# Patient Record
Sex: FEMALE | Race: BLACK OR AFRICAN AMERICAN | NOT HISPANIC OR LATINO | Employment: OTHER | URBAN - METROPOLITAN AREA
[De-identification: names, ages, dates, MRNs, and addresses within clinical notes are randomized per-mention and may not be internally consistent; named-entity substitution may affect disease eponyms.]

---

## 2022-01-26 ENCOUNTER — TELEPHONE (OUTPATIENT)
Dept: OBSTETRICS AND GYNECOLOGY | Facility: CLINIC | Age: 82
End: 2022-01-26
Payer: MEDICARE

## 2022-01-26 NOTE — TELEPHONE ENCOUNTER
----- Message from Chichi Espinoza sent at 1/25/2022 10:54 AM CST -----  Contact: pt  Type: Needs Medical Advice    Who Called:pt  Best Call Back Number:[phone]  Additional  Information: pt would like an order put in to get her mammo done and schedule annual if it's time  Please Advise- Thank you

## 2022-02-15 ENCOUNTER — OFFICE VISIT (OUTPATIENT)
Dept: OBSTETRICS AND GYNECOLOGY | Facility: CLINIC | Age: 82
End: 2022-02-15
Payer: MEDICARE

## 2022-02-15 VITALS — DIASTOLIC BLOOD PRESSURE: 74 MMHG | WEIGHT: 178 LBS | SYSTOLIC BLOOD PRESSURE: 116 MMHG

## 2022-02-15 DIAGNOSIS — Z01.419 ENCOUNTER FOR ANNUAL ROUTINE GYNECOLOGICAL EXAMINATION: Primary | ICD-10-CM

## 2022-02-15 PROCEDURE — G0101 PR CA SCREEN;PELVIC/BREAST EXAM: ICD-10-PCS | Mod: S$GLB,,, | Performed by: OBSTETRICS & GYNECOLOGY

## 2022-02-15 PROCEDURE — G0101 CA SCREEN;PELVIC/BREAST EXAM: HCPCS | Mod: S$GLB,,, | Performed by: OBSTETRICS & GYNECOLOGY

## 2022-02-15 RX ORDER — LATANOPROST 50 UG/ML
1 SOLUTION/ DROPS OPHTHALMIC NIGHTLY
COMMUNITY
Start: 2022-02-04

## 2022-02-15 RX ORDER — ASPIRIN 81 MG/1
81 TABLET ORAL
COMMUNITY
Start: 2021-07-06 | End: 2023-06-20

## 2022-02-15 RX ORDER — SUCRALFATE 1 G/1
1 TABLET ORAL
COMMUNITY
Start: 2021-07-10 | End: 2023-06-20

## 2022-02-15 RX ORDER — LEVOTHYROXINE SODIUM 75 UG/1
1 TABLET ORAL EVERY MORNING
COMMUNITY
Start: 2022-01-19 | End: 2023-06-20

## 2022-02-15 RX ORDER — METFORMIN HYDROCHLORIDE 500 MG/1
500 TABLET, EXTENDED RELEASE ORAL
COMMUNITY
Start: 2022-01-14 | End: 2023-06-20 | Stop reason: SDUPTHER

## 2022-02-15 RX ORDER — CLOTRIMAZOLE AND BETAMETHASONE DIPROPIONATE 10; .64 MG/G; MG/G
CREAM TOPICAL 2 TIMES DAILY
COMMUNITY
End: 2023-02-28 | Stop reason: SDUPTHER

## 2022-02-15 RX ORDER — METOPROLOL TARTRATE 25 MG/1
25 TABLET, FILM COATED ORAL
COMMUNITY
Start: 2021-03-01 | End: 2023-06-20

## 2022-02-15 RX ORDER — FAMOTIDINE 40 MG/1
40 TABLET, FILM COATED ORAL
COMMUNITY
Start: 2021-08-31 | End: 2023-06-20 | Stop reason: SDUPTHER

## 2022-02-15 RX ORDER — MULTIVITAMIN
1 TABLET ORAL DAILY
COMMUNITY

## 2022-02-15 RX ORDER — OMEPRAZOLE 40 MG/1
40 CAPSULE, DELAYED RELEASE ORAL 2 TIMES DAILY
COMMUNITY
Start: 2021-08-31

## 2022-02-15 RX ORDER — METOPROLOL SUCCINATE 50 MG/1
50 TABLET, EXTENDED RELEASE ORAL
COMMUNITY
Start: 2021-07-06 | End: 2023-06-20 | Stop reason: SDUPTHER

## 2022-02-15 RX ORDER — DIAPER,BRIEF,INFANT-TODD,DISP
1 EACH MISCELLANEOUS DAILY
COMMUNITY

## 2022-02-15 NOTE — PROGRESS NOTES
Medicare Breast and Pelvic    HPI:  Juan Alberto Hendrickson is a 81 y.o. female  presents for a well woman exam.  LMP: No LMP recorded. Patient has had a hysterectomy..  No issues, problems, or complaints.    Past Medical History:   Diagnosis Date    Diabetes mellitus     Hyperlipidemia     Hypertension     Tachycardia     Thyroid disease      Past Surgical History:   Procedure Laterality Date    CHOLECYSTECTOMY      HYSTERECTOMY      THYROID SURGERY      TOTAL KNEE REPLACEMENT USING COMPUTER NAVIGATION       Social History     Socioeconomic History    Marital status:    Tobacco Use    Smoking status: Never Smoker    Smokeless tobacco: Never Used   Substance and Sexual Activity    Alcohol use: Not Currently    Sexual activity: Not Currently     Family History   Problem Relation Age of Onset    Colon cancer Paternal Uncle     Breast cancer Neg Hx     Ovarian cancer Neg Hx     Uterine cancer Neg Hx      OB History        7    Para   7    Term                AB        Living           SAB        IAB        Ectopic        Multiple        Live Births                     /74 (BP Location: Right arm, Patient Position: Sitting)   Wt 80.7 kg (178 lb)     ROS:  GENERAL: Denies weight gain or weight loss. Feeling well overall.   SKIN: Denies rash or lesions.   HEAD: Denies head injury or headache.   NODES: Denies enlarged lymph nodes.   CHEST: Denies chest pain or shortness of breath.   CARDIOVASCULAR: Denies palpitations or left sided chest pain.   ABDOMEN: No abdominal pain, constipation, diarrhea, nausea, vomiting or rectal bleeding.   URINARY: No frequency, dysuria, hematuria, or burning on urination.  REPRODUCTIVE: See HPI.   BREASTS: The patient performs breast self-examination and denies pain, lumps, or nipple discharge.   HEMATOLOGIC: No easy bruisability or excessive bleeding.   MUSCULOSKELETAL: Denies joint pain or swelling.   NEUROLOGIC: Denies syncope or weakness.    PSYCHIATRIC: Denies depression, anxiety or mood swings.    PHYSICAL EXAM:    APPEARANCE: Well nourished, well developed, in no acute distress.  AFFECT: WNL, alert and oriented x 3  SKIN: No acne or hirsutism  NECK: Neck symmetric without masses or thyromegaly  NODES: No inguinal, cervical, axillary, or femoral lymph node enlargement  CHEST: Good respiratory effect  ABDOMEN: Soft.  No tenderness or masses.  No hepatosplenomegaly.  No hernias.  BREASTS: Symmetrical, no skin changes or visible lesions.  No palpable masses, nipple discharge bilaterally.  PELVIC: Normal external genitalia without lesions.  Normal hair distribution.  Adequate perineal body, normal urethral meatus.  Vagina moist and well rugated without lesions or discharge.  CERVIX: Absent, No significant cystocele or rectocele.    UTERUS: Absent,  Adnexa without masses or tenderness.    RECTAL: Rectovaginal exam confirms above with normal sphincter tone, no masses.  EXTREMITIES: No edema.      ICD-10-CM ICD-9-CM    1. Encounter for annual routine gynecological examination  Z01.419 V72.31      Mammogram ordered  Return in 1 year or as needed

## 2023-02-16 LAB
LEFT EYE DM RETINOPATHY: NEGATIVE
RIGHT EYE DM RETINOPATHY: NEGATIVE

## 2023-02-28 ENCOUNTER — OFFICE VISIT (OUTPATIENT)
Dept: OBSTETRICS AND GYNECOLOGY | Facility: CLINIC | Age: 83
End: 2023-02-28
Payer: MEDICARE

## 2023-02-28 VITALS
BODY MASS INDEX: 29.82 KG/M2 | HEIGHT: 65 IN | WEIGHT: 179 LBS | DIASTOLIC BLOOD PRESSURE: 82 MMHG | SYSTOLIC BLOOD PRESSURE: 132 MMHG

## 2023-02-28 DIAGNOSIS — N76.3 CHRONIC VULVITIS: ICD-10-CM

## 2023-02-28 DIAGNOSIS — Z01.419 ENCOUNTER FOR ANNUAL ROUTINE GYNECOLOGICAL EXAMINATION: Primary | ICD-10-CM

## 2023-02-28 PROCEDURE — G0101 PR CA SCREEN;PELVIC/BREAST EXAM: ICD-10-PCS | Mod: GZ,S$GLB,, | Performed by: OBSTETRICS & GYNECOLOGY

## 2023-02-28 PROCEDURE — G0101 CA SCREEN;PELVIC/BREAST EXAM: HCPCS | Mod: GZ,S$GLB,, | Performed by: OBSTETRICS & GYNECOLOGY

## 2023-02-28 RX ORDER — SIMVASTATIN 40 MG/1
40 TABLET, FILM COATED ORAL
COMMUNITY
Start: 2022-05-05 | End: 2023-06-20 | Stop reason: SDUPTHER

## 2023-02-28 RX ORDER — LEVOTHYROXINE SODIUM 50 UG/1
50 TABLET ORAL EVERY MORNING
COMMUNITY
Start: 2023-02-18 | End: 2023-06-20

## 2023-02-28 RX ORDER — CLOTRIMAZOLE AND BETAMETHASONE DIPROPIONATE 10; .64 MG/G; MG/G
CREAM TOPICAL 2 TIMES DAILY PRN
Qty: 15 G | Refills: 2 | Status: SHIPPED | OUTPATIENT
Start: 2023-02-28 | End: 2024-03-05 | Stop reason: SDUPTHER

## 2023-02-28 NOTE — PROGRESS NOTES
"Medicare Breast and Pelvic    HPI:  Juan Alberto Hendrickson is a 83 y.o. female  presents for a well woman exam.  LMP: No LMP recorded. Patient has had a hysterectomy..  No issues, problems, or complaints.    Past Medical History:   Diagnosis Date    Diabetes mellitus     Hyperlipidemia     Hypertension     Tachycardia     Thyroid disease      Past Surgical History:   Procedure Laterality Date    CHOLECYSTECTOMY      HYSTERECTOMY      TAHBSO age 31    OOPHORECTOMY Bilateral     BSO with hysterectomy    THYROID SURGERY      TOTAL KNEE REPLACEMENT USING COMPUTER NAVIGATION       Social History     Socioeconomic History    Marital status:    Tobacco Use    Smoking status: Never    Smokeless tobacco: Never   Substance and Sexual Activity    Alcohol use: Not Currently    Sexual activity: Not Currently     Family History   Problem Relation Age of Onset    Colon cancer Paternal Uncle     Breast cancer Neg Hx     Ovarian cancer Neg Hx     Uterine cancer Neg Hx      OB History          7    Para   7    Term                AB        Living             SAB        IAB        Ectopic        Multiple        Live Births                     /82 (BP Location: Right arm, Patient Position: Sitting)   Ht 5' 5" (1.651 m)   Wt 81.2 kg (179 lb)   BMI 29.79 kg/m²     ROS:  GENERAL: Denies weight gain or weight loss. Feeling well overall.   SKIN: Denies rash or lesions.   HEAD: Denies head injury or headache.   NODES: Denies enlarged lymph nodes.   CHEST: Denies chest pain or shortness of breath.   CARDIOVASCULAR: Denies palpitations or left sided chest pain.   ABDOMEN: No abdominal pain, constipation, diarrhea, nausea, vomiting or rectal bleeding.   URINARY: No frequency, dysuria, hematuria, or burning on urination.  REPRODUCTIVE: See HPI.   BREASTS: The patient performs breast self-examination and denies pain, lumps, or nipple discharge.   HEMATOLOGIC: No easy bruisability or excessive bleeding.   MUSCULOSKELETAL: " Denies joint pain or swelling.   NEUROLOGIC: Denies syncope or weakness.   PSYCHIATRIC: Denies depression, anxiety or mood swings.    PHYSICAL EXAM:    APPEARANCE: Well nourished, well developed, in no acute distress.  AFFECT: WNL, alert and oriented x 3  SKIN: No acne or hirsutism  NECK: Neck symmetric without masses or thyromegaly  NODES: No inguinal, cervical, axillary, or femoral lymph node enlargement  CHEST: Good respiratory effect  ABDOMEN: Soft.  No tenderness or masses.  No hepatosplenomegaly.  No hernias.  BREASTS: Symmetrical, no skin changes or visible lesions.  No palpable masses, nipple discharge bilaterally.  PELVIC: Normal external genitalia without lesions.  Normal hair distribution.  Adequate perineal body, normal urethral meatus.  Vagina moist and well rugated without lesions or discharge.  CERVIX: Absent, No significant cystocele or rectocele.    UTERUS: Absent,  Adnexa without masses or tenderness.    RECTAL: Rectovaginal exam confirms above with normal sphincter tone, no masses.  EXTREMITIES: No edema.      ICD-10-CM ICD-9-CM    1. Encounter for annual routine gynecological examination  Z01.419 V72.31       2. Chronic vulvitis  N76.3 616.10 clotrimazole-betamethasone 1-0.05% (LOTRISONE) cream          Negative gyn exam status post TAHBSO  Mammogram ordered  Refill Lotrisone p.r.n.  Return in 1 year or as needed

## 2023-06-20 ENCOUNTER — OFFICE VISIT (OUTPATIENT)
Dept: PRIMARY CARE CLINIC | Facility: CLINIC | Age: 83
End: 2023-06-20
Payer: MEDICARE

## 2023-06-20 VITALS
HEART RATE: 58 BPM | SYSTOLIC BLOOD PRESSURE: 145 MMHG | BODY MASS INDEX: 28.36 KG/M2 | OXYGEN SATURATION: 99 % | WEIGHT: 170.19 LBS | DIASTOLIC BLOOD PRESSURE: 66 MMHG | TEMPERATURE: 98 F | HEIGHT: 65 IN

## 2023-06-20 DIAGNOSIS — I10 ESSENTIAL HYPERTENSION, BENIGN: ICD-10-CM

## 2023-06-20 DIAGNOSIS — E11.610 DIABETIC NEUROPATHIC ARTHRITIS: ICD-10-CM

## 2023-06-20 DIAGNOSIS — M81.0 SENILE OSTEOPOROSIS: Primary | ICD-10-CM

## 2023-06-20 DIAGNOSIS — E11.65 INADEQUATELY CONTROLLED DIABETES MELLITUS: ICD-10-CM

## 2023-06-20 DIAGNOSIS — Z00.00 PREVENTATIVE HEALTH CARE: ICD-10-CM

## 2023-06-20 DIAGNOSIS — K21.9 GASTROESOPHAGEAL REFLUX DISEASE WITHOUT ESOPHAGITIS: ICD-10-CM

## 2023-06-20 PROCEDURE — 99204 OFFICE O/P NEW MOD 45 MIN: CPT | Mod: S$GLB,,, | Performed by: INTERNAL MEDICINE

## 2023-06-20 PROCEDURE — 99204 PR OFFICE/OUTPT VISIT, NEW, LEVL IV, 45-59 MIN: ICD-10-PCS | Mod: S$GLB,,, | Performed by: INTERNAL MEDICINE

## 2023-06-20 RX ORDER — LEVOCETIRIZINE DIHYDROCHLORIDE 5 MG/1
5 TABLET, FILM COATED ORAL DAILY PRN
COMMUNITY

## 2023-06-20 RX ORDER — SIMVASTATIN 40 MG/1
40 TABLET, FILM COATED ORAL
COMMUNITY
Start: 2023-06-13

## 2023-06-20 RX ORDER — FAMOTIDINE 40 MG/1
40 TABLET, FILM COATED ORAL NIGHTLY PRN
Qty: 90 TABLET | Refills: 3 | Status: SHIPPED | OUTPATIENT
Start: 2023-06-20 | End: 2023-08-18 | Stop reason: SDUPTHER

## 2023-06-20 RX ORDER — METOPROLOL SUCCINATE 50 MG/1
50 TABLET, EXTENDED RELEASE ORAL DAILY
Qty: 90 TABLET | Refills: 3 | Status: SHIPPED | OUTPATIENT
Start: 2023-06-20 | End: 2023-08-18 | Stop reason: SDUPTHER

## 2023-06-20 RX ORDER — LEVOTHYROXINE SODIUM 75 UG/1
75 TABLET ORAL
COMMUNITY
Start: 2023-03-30 | End: 2023-06-20

## 2023-06-20 RX ORDER — METFORMIN HYDROCHLORIDE 500 MG/1
500 TABLET, EXTENDED RELEASE ORAL
COMMUNITY
Start: 2023-06-13 | End: 2023-08-18 | Stop reason: SDUPTHER

## 2023-06-20 RX ORDER — ASPIRIN 81 MG/1
81 TABLET ORAL DAILY
COMMUNITY

## 2023-06-20 RX ORDER — LEVOTHYROXINE SODIUM 75 UG/1
75 TABLET ORAL
COMMUNITY
End: 2023-07-18 | Stop reason: SDUPTHER

## 2023-06-20 NOTE — ASSESSMENT & PLAN NOTE
I gave her an order for a bone density scan  I gave written reminders to get her COVID, shingles tetanus and Prevnar 20 shots

## 2023-06-20 NOTE — PROGRESS NOTES
Subjective:       Patient ID: Juan Alberto Hendrickson is a 83 y.o. female.    Chief Complaint: Establish Care (Patient presents for establishing care with  due hx of diabetes and HTN. Currently under care of Dr. Laird the cardiologist for BP.)      Ms Hendrickson considered as a new patient today .............I have not seen her since 2019  History of hypothyroidism high blood pressure coronary artery disease diabetes and hyperlipidemia who presents today to reestablish medical care refills and follow-up on her labs  She does not voice any new complaints today    Review of Systems   Constitutional:  Negative for activity change, fever and unexpected weight change.   Respiratory: Negative.     Cardiovascular: Negative.    Neurological: Negative.        Objective:      Physical Exam  Vitals and nursing note reviewed. Exam conducted with a chaperone present.   Constitutional:       Appearance: Normal appearance.   HENT:      Head: Normocephalic and atraumatic.   Eyes:      Extraocular Movements: Extraocular movements intact.      Pupils: Pupils are equal, round, and reactive to light.   Cardiovascular:      Rate and Rhythm: Normal rate and regular rhythm.      Pulses: Normal pulses.      Heart sounds: Normal heart sounds.   Pulmonary:      Effort: Pulmonary effort is normal.      Breath sounds: Normal breath sounds.   Abdominal:      General: Abdomen is flat. Bowel sounds are normal.      Palpations: Abdomen is soft.   Musculoskeletal:      Cervical back: Normal range of motion and neck supple.   Neurological:      Mental Status: She is alert.       Assessment:       1. Senile osteoporosis  -     DXA Bone Density Axial Skeleton 1 or more sites; Future; Expected date: 06/20/2023    2. Essential hypertension, benign  Overview:  Elevated today    Assessment & Plan:  Recheck next month  Refill metoprolol      3. Diabetic neuropathic arthritis    4. Gastroesophageal reflux disease without esophagitis  Overview:  Stable    Assessment  & Plan:  Refill pepcid      5. Inadequately controlled diabetes mellitus  Overview:  Usually A1c under control    Assessment & Plan:  Monitor  Diet d/w Ms Hendrickson      6. Preventative health care  Overview:  See below    Assessment & Plan:  I gave her an order for a bone density scan  I gave written reminders to get her COVID, shingles tetanus and Prevnar 20 shots      Other orders  -     metoprolol succinate (TOPROL-XL) 50 MG 24 hr tablet; Take 1 tablet (50 mg total) by mouth once daily.  Dispense: 90 tablet; Refill: 3  -     famotidine (PEPCID) 40 MG tablet; Take 1 tablet (40 mg total) by mouth nightly as needed for Heartburn.  Dispense: 90 tablet; Refill: 3             Plan:       1. Senile osteoporosis  -     DXA Bone Density Axial Skeleton 1 or more sites; Future; Expected date: 06/20/2023    2. Essential hypertension, benign  Overview:  Elevated today    Assessment & Plan:  Recheck next month  Refill metoprolol      3. Diabetic neuropathic arthritis    4. Gastroesophageal reflux disease without esophagitis  Overview:  Stable    Assessment & Plan:  Refill pepcid      5. Inadequately controlled diabetes mellitus  Overview:  Usually A1c under control    Assessment & Plan:  Monitor  Diet d/w Ms Hendrickson      6. Preventative health care  Overview:  See below    Assessment & Plan:  I gave her an order for a bone density scan  I gave written reminders to get her COVID, shingles tetanus and Prevnar 20 shots      Other orders  -     metoprolol succinate (TOPROL-XL) 50 MG 24 hr tablet; Take 1 tablet (50 mg total) by mouth once daily.  Dispense: 90 tablet; Refill: 3  -     famotidine (PEPCID) 40 MG tablet; Take 1 tablet (40 mg total) by mouth nightly as needed for Heartburn.  Dispense: 90 tablet; Refill: 3    I spent a total of 45 minutes on the day of the visit.  This includes face to face time and non-face to face time preparing to see the patient (eg, review of tests), obtaining and/or reviewing separately obtained  history, documenting clinical information in the electronic or other health record, independently interpreting results and communicating results to the patient/family/caregiver, or care coordinator.

## 2023-07-18 ENCOUNTER — OFFICE VISIT (OUTPATIENT)
Dept: PRIMARY CARE CLINIC | Facility: CLINIC | Age: 83
End: 2023-07-18
Payer: MEDICARE

## 2023-07-18 VITALS
RESPIRATION RATE: 18 BRPM | OXYGEN SATURATION: 98 % | TEMPERATURE: 98 F | BODY MASS INDEX: 28.49 KG/M2 | DIASTOLIC BLOOD PRESSURE: 70 MMHG | HEART RATE: 73 BPM | WEIGHT: 171 LBS | SYSTOLIC BLOOD PRESSURE: 138 MMHG | HEIGHT: 65 IN

## 2023-07-18 DIAGNOSIS — I10 ESSENTIAL HYPERTENSION, BENIGN: ICD-10-CM

## 2023-07-18 DIAGNOSIS — Z00.00 ENCOUNTER FOR ANNUAL WELLNESS VISIT (AWV) IN MEDICARE PATIENT: ICD-10-CM

## 2023-07-18 DIAGNOSIS — E11.9 DIABETIC EYE EXAM: Primary | ICD-10-CM

## 2023-07-18 DIAGNOSIS — Z01.00 DIABETIC EYE EXAM: Primary | ICD-10-CM

## 2023-07-18 PROCEDURE — 99213 OFFICE O/P EST LOW 20 MIN: CPT | Mod: 25,S$GLB,, | Performed by: INTERNAL MEDICINE

## 2023-07-18 PROCEDURE — 99213 PR OFFICE/OUTPT VISIT, EST, LEVL III, 20-29 MIN: ICD-10-PCS | Mod: 25,S$GLB,, | Performed by: INTERNAL MEDICINE

## 2023-07-18 PROCEDURE — G0439 PR MEDICARE ANNUAL WELLNESS SUBSEQUENT VISIT: ICD-10-PCS | Mod: S$GLB,,, | Performed by: INTERNAL MEDICINE

## 2023-07-18 PROCEDURE — G0439 PPPS, SUBSEQ VISIT: HCPCS | Mod: S$GLB,,, | Performed by: INTERNAL MEDICINE

## 2023-07-18 RX ORDER — SOD SULF/POT CHLORIDE/MAG SULF 1.479 G
TABLET ORAL
COMMUNITY
Start: 2023-03-29

## 2023-07-18 RX ORDER — HYOSCYAMINE SULFATE 0.12 MG/1
0.13 TABLET SUBLINGUAL
COMMUNITY
Start: 2021-08-31

## 2023-07-18 RX ORDER — LOSARTAN POTASSIUM 25 MG/1
25 TABLET ORAL NIGHTLY
Qty: 90 TABLET | Refills: 3 | Status: SHIPPED | OUTPATIENT
Start: 2023-07-18 | End: 2024-03-05

## 2023-07-18 RX ORDER — LEVOTHYROXINE SODIUM 75 UG/1
75 TABLET ORAL
Qty: 90 TABLET | Refills: 3 | Status: SHIPPED | OUTPATIENT
Start: 2023-07-18 | End: 2024-07-17

## 2023-07-18 RX ORDER — POLYETHYLENE GLYCOL 3350 17 G/17G
POWDER, FOR SOLUTION ORAL
COMMUNITY
Start: 2023-03-29

## 2023-07-18 RX ORDER — ONDANSETRON 4 MG/1
4 TABLET, ORALLY DISINTEGRATING ORAL EVERY 8 HOURS PRN
COMMUNITY
Start: 2023-06-02

## 2023-07-18 NOTE — ASSESSMENT & PLAN NOTE
Get the vaccines  Info re AD provided  DEXA : done 7/12/23  Diet, exercise d/w pt  Eye exam : will be done at Dr Tripp at [place] in Aug

## 2023-07-18 NOTE — Clinical Note
DEXA : done at [facility] ( its in  ) on 7/12/23 I gave her written instr re the 4 missing vaccines

## 2023-07-18 NOTE — Clinical Note
Get the vaccines Info re AD provided DEXA : done 7/12/23 Diet, exercise d/w pt Eye exam : will be done at Dr Tripp at [place] in Aug

## 2023-07-18 NOTE — PROGRESS NOTES
Subjective:       Patient ID: Juan Alberto Hendrickson is a 83 y.o. female.    Chief Complaint: Follow-up (1 month), Hypertension, and Medication Refill      Annual Medicare wellness visit :  Reported by patient.  Diet and nutrition:  Healthy diet  Fracture risk:  No history of fractures; no recent explain infection; no sudden unexplained fractures; previous musculoskeletal injuries  Physical activity:  Exercise on a regular basis; recent increase in physical activity; good  Physical condition  Depression risk:  Never feels sad, empty or tearful; no loss of interest in activities; no significant changes in weight; no sleep disturbances or insomnia; no agitation; no loss of energy; no feelings of worthlessness or guilt; no thoughts of suicide; no history of depression; no history of mood disorders  Orientation:  No disorientation to time; no disorientation to date; no disorientation to place  Concentration and memory:  No decreased concentration ability; no memory lapses or loss; does not forget words  Speech/motor difficulties:  No speech difficulties; no difficulty expressing formulated concepts; no difficulty with fine manipulative tasks; no difficulty writing/coping; no slowed reaction time/; does not knock things over with trying to pick them up  Hearing: No loss of hearing  Vision:  No vision problems  Activities of daily living:  Able to bathe with limited or no assistance; able to control urination and bowels; able to dress with limited or no assistance; able to feed herself with limited or no assistance; able to get out of chair or bed with limited or no assistance; able to Groom with limited or no assistance; able to toilet with limited or no assistance  Instrumental activities of daily living: Able to do housework with limited or no assistance; able to grocery shop with limited or no assistance; able to manage medications with limited or no assistance; able to manage money with limited or no assistance; able to prepare  meals with limited or no assistance; able to use the phone with limited or no assistance  Falls risk assessment: No frequent falls while walking; no 4 in the past year; no falls since last visit; no dizziness/vertigo  Home safety:  No unsafe courtney hazards; no unsafe stairs; no unsafe gas appliances; Working smoke/CO detectors; wears protective head gear for biking/high velocity; use of seatbelts; practicing 'safer sex'; no vision or hearing loss while driving; no firearms; has hand bars in the bathroom/shower; good lighting in the home        Follow-up    Hypertension  This is a chronic problem. The current episode started more than 1 year ago. The problem has been waxing and waning since onset. The problem is uncontrolled. Associated symptoms include anxiety. There are no associated agents to hypertension. Risk factors for coronary artery disease include diabetes mellitus, dyslipidemia, family history, sedentary lifestyle and post-menopausal state. Past treatments include beta blockers. The current treatment provides moderate improvement. Compliance problems include exercise and psychosocial issues.  Hypertensive end-organ damage includes CAD/MI and left ventricular hypertrophy. Identifiable causes of hypertension include chronic renal disease and a thyroid problem.   Medication Refill    Review of Systems Review of system:  Patient reports no dry skin, no itching, no abnormal moles, no jaundice, no rashes, no hives, no discoloration, no excessive facial or body hair between bracket hirsutism, no hair thinning, no growth/lesions, and no excessive sweating; patient reports no fever, no chills, no night sweats, no significant weight gain, no significant weight loss, no exercise intolerance  Patient reports normal appetite and no sleep disturbances (insomnia)  Patient reports no dry eyes, no irritation, no pain in the eyes, no visual changes, no floaters, no sensitivity to light between bracket photophobia, no  seeing double between bracket diplopia, and  No discharge.  Patient reports no difficulty hearing, no ear pain, no vertigo and no ringing in the ears between bracket tinnitus.  Patient reports no difficulty smelling, no frequent nosebleeds, and no nose/sinus  Problems.  Patient reports no dry mouth, no unusual taste of foods, no mouth ulcers, no bleeding gums, and oral abnormalities and no teeth problem  Patient reports no sore throat, no difficulty swallowing between bracket dysphagia, no anterior neck pain/tenderness, no snoring no change in voice.  Patient reports no chest pain, no arm pain on exertion, no shortness of breath when walking, no shortness of breath when lying down, no palpitations, no known heart murmur, no lightheadedness, no calf or jaw pains, and no ankle edema.  Patient reports no cough, no nasal congestion, no runny nose, no sinus pressure, no wheezing, no frequent sneezing, no shortness of breath, no rapid breathing, no sputum production and no coughing up blood  Patient reports no nausea, no vomiting, no vomiting blood, no abdominal pain, normal appetite, no diarrhea, no constipation, no dyspepsia/reflux, no heartburn, no early satiety, complete emptying of stool cup, no bloating, able to restrain bowel movements, no bowel urgency and no blood in stools/on toilet paper.  Patient reports no pain during urination, no incontinence, no difficulty urinating, no hematuria, no increased frequency, no feelings of urgency, no flank pains and no urinary tract infections  Patient reports no muscle aches, no muscle weakness, no muscle cramps, no arthralgias/joint pain, no back pains, no swelling in the extremities and no difficulty walking.  Patient reports in dependency.  Patient reports no loss of consciousness, no slurred speech, no weakness, no numbness, no tingling, no tremors, no seizures, no dizziness, no headaches, no memory lapses or changes, no difficulty finding desired words, no loss of  balance or falls  Patient reports no irritability, no depression, no anxiety, no panic attacks, no sleep disturbances, feeling safe in her relationship, no paranoia and no thoughts about suicide  Patient reports no fatigue, no cold intolerance, no heat intolerance and no unusual body odor  Patient reports no bruising, no swollen glands, no clotting problems, and no bleeding disorders    Review for Opioid Screening: Pt does not have Rx for Opioids (If patient has Rx list here)      Review for Substance Use Disorders: Patient does not use substance (If patient has Rx list here)           Objective:      Physical Exam  Physical Exam:  Patient is a  year old   Constitutional:  General appearance:  Healthy appearing, well nourished, well developed, and well groomed.  Level of distress:  NAD.  Ambulation:  Ambulating normally.  Psychiatric:  Insight:  Good judgment.  Mental status:  Normal mood and affect an active and alert.  Orientation:  To time, place and person.  Memory:  Recent memory  Normal and remote memory normal.  Head:  Normocephalic, atraumatic, symmetrical, no tenderness, and hair is evenly distributed.  Eyes:  Lids and conjunctivae:  No discharge, pallor or redness and noninjected.  Pupils:  Kept it PERRLA.  Corneas: Grossly intact and fluorescein stain normal.  Funduscopic examination:  Normal vessels and optic discs, no exudates or hemorrhages and grossly normal except where noted.  Capital E OM: Intact.  Lungs: Clear.  Sclera: Nonicteric.  Vision: Peripheral vision grossly intact in acuity grossly intact.  Optic disc:  Normal appearance and vessels and no exudates or hemorrhages  ENMT:  Ears:  No lesions on external ear, EACs clear.  TMs clear.  TM mobility normal and normal general appearance.  Hearing:  No hearing loss and Rinne: AC > BC.  Nose:  No polyps, lesions on external nose, septal deviation, sinus tenderness, or nasal discharge; nasal passages clear mucosa pink; and nares patent.  Lips teeth  and gums: No mouth or lip ulcers or bleeding.  Gums are normal dentition normal.  Oropharynx:  No erythema exudates or ulcers and moist mucous membranes.  Tonsils not enlarged.  Oral mucosa and salivary glands normal  Neck:  Neck is supple, symmetrical, trachea midline and no masses.  Lymph nodes:  No cervical lymphadenopathy.  Thyroid no enlargement or nodules and nontender  Lungs:  Respiratory effort no dyspnea.  Percussion: No dullness, flatness or hyper-resonance.  Auscultation:  No wheezing, rales/crackles, no rhonchi and breath sounds normal, good air movement, and clear to auscultation except as noted.  Chest deformity: normal thoracic no deformities  Cardiovascular:  Apical pulse: Nondisplaced.  Heart auscultation:  Normal S1 and S2; no murmurs rubs or gallops; and RRR.  Neck vessels: No carotid bruit on the right or left and no JVDs or hepatojugular reflux.  Arterial pulses normal on the right and left radial femoral and dorsalis pedis and posterior tibial.  Varicosities right and left non-existent and capillary refill test normal.  Edema none: on the right or left  Breast: deferred  Abdomen:  Bowel sounds normal.  Inspection and palpation:  No tenderness guarding masses rebound tenderness or CVA tenderness and soft and nondistended.  Liver column nontender and no hepatomegaly.  Spleen:  Nontender and no splenomegaly.  Hernia: Nonpalpable  Female :  Deferred  Rectal:  Deferred  Musculoskeletal:  Motor strength and tone:  Normal and normal tone.  Joints bones and muscles:  No contractures malalignment, tenderness or bony abnormalities and normal movement of all extremities and posture is normal.  Extremities:  No cyanosis clubbing or palpable cords  Neurological examination: Gait and station:  Normal gait and station.  Cranial nerves:  Grossly intact.  Sensation:  Monofilament test intact and normal and grossly intact.  Reflexes:  DTRs 2+ bilaterally throughout.  Coordination and cerebellum: no intention  tremors, no resting tremors. Romberg's sign: negative. Motor strength normal on the right and left.  Sensation normal on the right and left side.  No pain/temperature decrease on the legs and dorsum of the feet.  No tactile decreased on the leg and dorsum of the feet.  No vibration- perception threshold decrease  Skin:  Inspection and palpation: No rash, lesions, ulcers, nodules, jaundice or abnormal nevi and good turgor.  Nails:  Normal.  Right and left lower extremities column normal  Back: Thoracolumbar appearance: Normal curvature  Foot examination:  Right and left feet were examined, and toes were examined:  No deformity, inter digital erythema, or nail changes or disorders and digital hair present and normal motion.       Assessment:       1. Diabetic eye exam  -     Ambulatory referral/consult to Ophthalmology; Future; Expected date: 07/25/2023    2. Encounter for annual wellness visit (AWV) in Medicare patient  Overview:  See below    Assessment & Plan:  Get the vaccines  Info re AD provided  DEXA : done 7/12/23  Diet, exercise d/w pt  Eye exam : will be done at Dr Tripp at [facility] in Aug      3. Essential hypertension, benign  Overview:  Elevated today    Assessment & Plan:  Prehypertension  Add small dose of ARB      Other orders  -     levothyroxine (SYNTHROID) 75 MCG tablet; Take 1 tablet (75 mcg total) by mouth before breakfast.  Dispense: 90 tablet; Refill: 3  -     losartan (COZAAR) 25 MG tablet; Take 1 tablet (25 mg total) by mouth every evening.  Dispense: 90 tablet; Refill: 3             Plan:       1. Diabetic eye exam  -     Ambulatory referral/consult to Ophthalmology; Future; Expected date: 07/25/2023    2. Encounter for annual wellness visit (AWV) in Medicare patient  Overview:  See below    Assessment & Plan:  Get the vaccines  Info re AD provided  DEXA : done 7/12/23  Diet, exercise d/w pt  Eye exam : will be done at Dr Tripp [facility] in Aug      3. Essential hypertension,  benign  Overview:  Elevated today    Assessment & Plan:  Prehypertension  Add small dose of ARB      Other orders  -     levothyroxine (SYNTHROID) 75 MCG tablet; Take 1 tablet (75 mcg total) by mouth before breakfast.  Dispense: 90 tablet; Refill: 3  -     losartan (COZAAR) 25 MG tablet; Take 1 tablet (25 mg total) by mouth every evening.  Dispense: 90 tablet; Refill: 3    I offered to discuss end of life issues, including information on how to make advance directives that the patient could use to name someone who would make medical decisions on their behalf if they became too ill to make themselves.      __Patient declined       _X__Patient is interested, I provided paperwork and offered to discuss

## 2023-08-18 ENCOUNTER — OFFICE VISIT (OUTPATIENT)
Dept: PRIMARY CARE CLINIC | Facility: CLINIC | Age: 83
End: 2023-08-18
Payer: MEDICARE

## 2023-08-18 VITALS
WEIGHT: 170.88 LBS | BODY MASS INDEX: 28.47 KG/M2 | TEMPERATURE: 98 F | HEIGHT: 65 IN | OXYGEN SATURATION: 97 % | HEART RATE: 66 BPM

## 2023-08-18 DIAGNOSIS — I10 ESSENTIAL HYPERTENSION, BENIGN: ICD-10-CM

## 2023-08-18 DIAGNOSIS — E11.65 INADEQUATELY CONTROLLED DIABETES MELLITUS: Primary | ICD-10-CM

## 2023-08-18 DIAGNOSIS — Z00.00 ENCOUNTER FOR ANNUAL WELLNESS VISIT (AWV) IN MEDICARE PATIENT: ICD-10-CM

## 2023-08-18 DIAGNOSIS — K21.9 GASTROESOPHAGEAL REFLUX DISEASE WITHOUT ESOPHAGITIS: ICD-10-CM

## 2023-08-18 DIAGNOSIS — E11.9 DIABETES MELLITUS WITHOUT COMPLICATION: ICD-10-CM

## 2023-08-18 DIAGNOSIS — M85.88 OSTEOPENIA OF LUMBAR SPINE: ICD-10-CM

## 2023-08-18 PROCEDURE — 99214 OFFICE O/P EST MOD 30 MIN: CPT | Mod: S$GLB,,, | Performed by: INTERNAL MEDICINE

## 2023-08-18 PROCEDURE — 99214 PR OFFICE/OUTPT VISIT, EST, LEVL IV, 30-39 MIN: ICD-10-PCS | Mod: S$GLB,,, | Performed by: INTERNAL MEDICINE

## 2023-08-18 RX ORDER — METFORMIN HYDROCHLORIDE 500 MG/1
500 TABLET, EXTENDED RELEASE ORAL NIGHTLY
Qty: 90 TABLET | Refills: 3 | Status: SHIPPED | OUTPATIENT
Start: 2023-08-18 | End: 2024-08-17

## 2023-08-18 RX ORDER — ALENDRONATE SODIUM 35 MG/1
35 TABLET ORAL
Qty: 12 TABLET | Refills: 3 | Status: SHIPPED | OUTPATIENT
Start: 2023-08-18 | End: 2024-03-05

## 2023-08-18 RX ORDER — METOPROLOL SUCCINATE 50 MG/1
50 TABLET, EXTENDED RELEASE ORAL DAILY
Qty: 90 TABLET | Refills: 3 | Status: SHIPPED | OUTPATIENT
Start: 2023-08-18 | End: 2024-08-17

## 2023-08-18 RX ORDER — FAMOTIDINE 40 MG/1
40 TABLET, FILM COATED ORAL NIGHTLY PRN
Qty: 90 TABLET | Refills: 3 | Status: SHIPPED | OUTPATIENT
Start: 2023-08-18 | End: 2024-03-05

## 2023-08-18 NOTE — ASSESSMENT & PLAN NOTE
She's seeing her eye Josee Castellanos on Oct 31st  She also saw her around April of this year . Pls get report T : [phone]  I recommended the shingrix, Tdap & prevnar 20  DEXA : done July 23'  at [facility] booster taken on 7/18/23

## 2023-08-18 NOTE — Clinical Note
She's seeing her eye Josee Castellanos on Oct 31st She also saw her around April of this year . Pls get report T : [phone] I recommended the shingrix, Tdap & prevnar 20 DEXA : done July 23'  at [facility] booster taken on 7/18/23

## 2023-08-18 NOTE — PROGRESS NOTES
Subjective:       Patient ID: Juan Alberto Hendricksno is a 83 y.o. female.    Chief Complaint: Follow-up (Refills on famotidine.)      Patient is established already .......... presents today for a f/u visit , to discuss labs results and for management of the chronic conditions.        Follow-up  This is a chronic problem. The current episode started more than 1 year ago. The problem occurs intermittently. The problem has been gradually improving. Associated symptoms include arthralgias. Pertinent negatives include no fever. Associated symptoms comments: L leg,foot pain. The symptoms are aggravated by standing, twisting and walking. She has tried acetaminophen for the symptoms. The treatment provided moderate relief.     Review of Systems   Constitutional:  Negative for activity change, fever and unexpected weight change.   Respiratory: Negative.     Cardiovascular: Negative.    Musculoskeletal:  Positive for arthralgias and leg pain.   Neurological: Negative.          Objective:      Physical Exam  Vitals and nursing note reviewed.   Constitutional:       Appearance: Normal appearance.   Cardiovascular:      Rate and Rhythm: Normal rate and regular rhythm.   Pulmonary:      Effort: Pulmonary effort is normal.      Breath sounds: Normal breath sounds.   Musculoskeletal:      Cervical back: Normal range of motion and neck supple.   Neurological:      Mental Status: She is alert.         Assessment:       1. Inadequately controlled diabetes mellitus  Overview:  Usually A1c under control    Assessment & Plan:  No need to check BS at home  Recheck A1c in 6M    Orders:  -     Hemoglobin A1C; Standing    2. Encounter for annual wellness visit (AWV) in Medicare patient  Overview:  See below    Assessment & Plan:  She's seeing her eye Josee Castellanos on Oct 31st  She also saw her around April of this year . Pls get report T : [phone]  I recommended the shingrix, Tdap & prevnar 20  DEXA : done July 23'  at [facility] booster taken on  7/18/23        3. Osteopenia of lumbar spine  Overview:  Osteopenia    Assessment & Plan:  Add Ca++ , vit D  Add fosamax weekly      4. Essential hypertension, benign  Overview:  Elevated today    Orders:  -     Comprehensive Metabolic Panel; Future; Expected date: 08/18/2023    5. Diabetes mellitus without complication  -     Microalbumin/Creatinine Ratio, Urine; Future; Expected date: 08/18/2023    6. Gastroesophageal reflux disease without esophagitis  Overview:  Stable    Assessment & Plan:  Refill pepcid  Take your & prilosec , pepcid prn      Other orders  -     famotidine (PEPCID) 40 MG tablet; Take 1 tablet (40 mg total) by mouth nightly as needed for Heartburn (reflux).  Dispense: 90 tablet; Refill: 3  -     metoprolol succinate (TOPROL-XL) 50 MG 24 hr tablet; Take 1 tablet (50 mg total) by mouth once daily.  Dispense: 90 tablet; Refill: 3  -     metFORMIN (GLUCOPHAGE-XR) 500 MG ER 24hr tablet; Take 1 tablet (500 mg total) by mouth every evening.  Dispense: 90 tablet; Refill: 3  -     alendronate (FOSAMAX) 35 MG tablet; Take 1 tablet (35 mg total) by mouth every 7 days.  Dispense: 12 tablet; Refill: 3             Plan:       1. Inadequately controlled diabetes mellitus  Overview:  Usually A1c under control    Assessment & Plan:  No need to check BS at home  Recheck A1c in 6M    Orders:  -     Hemoglobin A1C; Standing    2. Encounter for annual wellness visit (AWV) in Medicare patient  Overview:  See below    Assessment & Plan:  She's seeing her eye Josee Castellanos on Oct 31st  She also saw her around April of this year . Pls get report T : [phone]  I recommended the shingrix, Tdap & prevnar 20  DEXA : done July 23'  at [facility]  COVID booster taken on 7/18/23        3. Osteopenia of lumbar spine  Overview:  Osteopenia    Assessment & Plan:  Add Ca++ , vit D  Add fosamax weekly      4. Essential hypertension, benign  Overview:  Elevated today    Orders:  -     Comprehensive Metabolic Panel; Future; Expected  date: 08/18/2023    5. Diabetes mellitus without complication  -     Microalbumin/Creatinine Ratio, Urine; Future; Expected date: 08/18/2023    6. Gastroesophageal reflux disease without esophagitis  Overview:  Stable    Assessment & Plan:  Refill pepcid  Take your & prilosec , pepcid prn      Other orders  -     famotidine (PEPCID) 40 MG tablet; Take 1 tablet (40 mg total) by mouth nightly as needed for Heartburn (reflux).  Dispense: 90 tablet; Refill: 3  -     metoprolol succinate (TOPROL-XL) 50 MG 24 hr tablet; Take 1 tablet (50 mg total) by mouth once daily.  Dispense: 90 tablet; Refill: 3  -     metFORMIN (GLUCOPHAGE-XR) 500 MG ER 24hr tablet; Take 1 tablet (500 mg total) by mouth every evening.  Dispense: 90 tablet; Refill: 3  -     alendronate (FOSAMAX) 35 MG tablet; Take 1 tablet (35 mg total) by mouth every 7 days.  Dispense: 12 tablet; Refill: 3

## 2023-08-21 ENCOUNTER — PATIENT OUTREACH (OUTPATIENT)
Dept: ADMINISTRATIVE | Facility: HOSPITAL | Age: 83
End: 2023-08-21
Payer: MEDICARE

## 2023-08-21 NOTE — LETTER
FAX      AUTHORIZATION FOR RELEASE OF   CONFIDENTIAL INFORMATION        DR DESHAUN BRANDT      We are seeing Juan Alberto Hendrickson, date of birth 1940, in the clinic at [facility]. aJmes Powell MD is the patient's PCP. Juan Alberto Hendrickson has an outstanding lab/procedure at the time we reviewed their chart. In order to help keep their health information updated, Juan Alberto Hendrickson has authorized us to request the following medical record(s):       DIABETIC EYE EXAM -WITH RETINOL SCREENING (MOST RECENT WITHIN 48 MONTHS)          Please fax records to James Powell MD at [facility] [phone]     If you have any questions, please contact Marlene at [phone].    Marlene House LPN  Performance Improvement Coordinator for Population Health  [facility]     Patient Name: Juan Alberto Hendrickson  : 1940  Patient Phone #: [phone]

## 2023-08-21 NOTE — LETTER
FAX      AUTHORIZATION FOR RELEASE OF   CONFIDENTIAL INFORMATION        [facility]      We are seeing Juan Alberto Hendrickson, date of birth 1940, in the clinic at [facility]. James Powell MD is the patient's PCP. Juan Alberto Hendrickson has an outstanding lab/procedure at the time we reviewed their chart. In order to help keep their health information updated, Juan Alberto Hendrickson has authorized us to request the following medical record(s):       BILATERAL MAMMOGRAM SCREENING-(MOST RECENT WITHIN 2 YRS)              Please fax records to James Powell MD at [facility] [phone]     If you have any questions, please contact Marlene at [phone].    Marlene House LPN  Performance Improvement Coordinator for Population Health  [facility]     Patient Name: Juan Alberto Hendrickson  : 1940  Patient Phone #: [phone]

## 2023-08-21 NOTE — PROGRESS NOTES
Population Health Chart Review & Patient Outreach Details:     Reason for Outreach Encounter:     [x]  Non-Compliant Report   []  Payor Report ([employer], PHN, BCBS, MSSP, MCIP, UHC, etc.)   []  Pre-Visit Chart Review     Updates Requested / Reviewed:     []  Care Everywhere    []     []  External Sources ([facility], [facility], DIS, etc.)   [x]  Care Team Updated    Patient Outreach Method:    []  Telephone Outreach Completed   [] Successful   [] Left Voicemail   [] Unable to Contact (wrong number, no voicemail)  []  [employer] Portal Outreach Sent  []  Letter Outreach Mailed  [x]  Fax Sent for External Records  []  External Records Upload    Health Maintenance Topics Addressed and Outreach Outcomes / Actions Taken:        []      Breast Cancer Screening []  Mammo Scheduled      []  External Records Requested     []  Added Reminder to Complete to Upcoming Primary Care Appt Notes     []  Patient Declined     []  Patient Will Call Back to Schedule     []  Patient Will Schedule with External Provider / Order Routed if Applicable             []       Cervical Cancer Screening []  Pap Scheduled      []  External Records Requested     []  Added Reminder to Complete to Upcoming Primary Care Appt Notes     []  Patient Declined     []  Patient Will Call Back to Schedule     []  Patient Will Schedule with External Provider               []          Colorectal Cancer Screening []  Colonoscopy Case Request or Referral Placed     []  External Records Requested     []  Added Reminder to Complete to Upcoming Primary Care Appt Notes     []  Patient Declined     []  Patient Will Call Back to Schedule     []  Patient Will Schedule with External Provider     []  Fit Kit Mailed (add the SmartPhrase under additional notes)     []  Reminded Patient to Complete Home Test             [x]      Diabetic Eye Exam []  Eye Camera Scheduled or Optometry Referral Placed     [x]  External Records Requested     []  Added Reminder to Complete to  Upcoming Primary Care Appt Notes     []  Patient Declined     []  Patient Will Call Back to Schedule     []  Patient Will Schedule with External Provider             []      Blood Pressure Control []  Primary Care Follow Up Visit Scheduled     []  Remote Blood Pressure Reading Captured     []  Added Reminder to Complete to Upcoming Primary Care Appt Notes     []  Patient Declined     []  Patient Will Call Back / Patient Will Send Portal Message with Reading     []  Patient Will Call Back to Schedule Provider Visit             []       HbA1c & Other Labs []  Lab Appt Scheduled for Due Labs     []  Primary Care Follow Up Visit Scheduled      []  Reminded Patient to Complete Home Test     []  Added Reminder to Complete to Upcoming Primary Care Appt Notes     []  Patient Declined     []  Patient Will Call Back to Schedule     []  Patient Will Schedule with External Provider / Order Routed if Applicable           []    Schedule Primary Care Appt []  Primary Care Appt Scheduled     []  Patient Declined     []  Patient Will Call Back to Schedule     []  Pt Established with External Provider & Updated Care Team             []      Medication Adherence []  Primary Care Appointment Scheduled     []  Added Reminder to Upcoming Primary Care Appt Notes     []  Patient Reminded to  Prescription     []  Patient Declined, Provider Notified if Needed     []  Sent Provider Message to Review and/or Add Exclusion to Problem List             [x]      Osteoporosis Screening []  DXA Appointment Scheduled     [x]  External Records Requested     []  Added Reminder to Complete to Upcoming Primary Care Appt Notes     []  Patient Declined     []  Patient Will Call Back to Schedule     []  Patient Will Schedule with External Provider / Order Routed if Applicable     Additional Care Coordinator Notes:    08/21/2023  EFAX SENT TO [facility] FOR MOST RECENT DEXA SCREENING RESULTS  EFAX SENT TO DR BRANDT FOR MOST RECENT DM EYE EXAM  RESULTS    Further Action Needed If Patient Returns Outreach:

## 2023-09-05 ENCOUNTER — PATIENT OUTREACH (OUTPATIENT)
Dept: ADMINISTRATIVE | Facility: HOSPITAL | Age: 83
End: 2023-09-05
Payer: MEDICARE

## 2023-09-05 NOTE — LETTER
AUTHORIZATION FOR RELEASE OF   CONFIDENTIAL INFORMATION    Dear Dr Tripp,    We are seeing Juan Alberto Hendrickson, date of birth 1940, in the clinic at [facility]. James Powell MD is the patient's PCP. Juan Alberto Hendrickson has an outstanding lab/procedure at the time we reviewed her chart. In order to help keep her health information updated, she has authorized us to request the following medical record(s):        (  )  MAMMOGRAM                                      (  )  COLONOSCOPY      (  )  PAP SMEAR                                          (  )  OUTSIDE LAB RESULTS     (  )  DEXA SCAN                                          ( X )  EYE EXAM            (  )  FOOT EXAM                                          (  )  ENTIRE RECORD     (  )  OUTSIDE IMMUNIZATIONS                 (  )  _______________         Please fax records to [facility], Gorgi-Mikhail, Magdy, MD at [phone]     Thanks so much and have a great day!    Kate Wolf LPN [address] [facility] [place] [address]  - [phone]   [phone]           Patient Name: Juan Alberto Hendrickson  : 1940  Patient Phone #: [phone]

## 2023-09-08 ENCOUNTER — PATIENT OUTREACH (OUTPATIENT)
Dept: ADMINISTRATIVE | Facility: HOSPITAL | Age: 83
End: 2023-09-08
Payer: MEDICARE

## 2023-10-17 LAB
LEFT EYE DM RETINOPATHY: NEGATIVE
RIGHT EYE DM RETINOPATHY: NEGATIVE

## 2023-10-23 PROBLEM — Z00.00 ENCOUNTER FOR ANNUAL WELLNESS VISIT (AWV) IN MEDICARE PATIENT: Status: RESOLVED | Noted: 2023-06-20 | Resolved: 2023-10-23

## 2024-01-02 ENCOUNTER — TELEPHONE (OUTPATIENT)
Dept: FAMILY MEDICINE | Facility: CLINIC | Age: 84
End: 2024-01-02
Payer: MEDICARE

## 2024-01-02 NOTE — TELEPHONE ENCOUNTER
----- Message from James Powell MD sent at 12/26/2023  6:41 PM CST -----  Regarding: Alendronate  Please ask her if she's still taking her alendronate weekly for osteoporosis? Her insurance sent me a letter that she hasn't filled it for a while.   Ty

## 2024-02-19 ENCOUNTER — LAB VISIT (OUTPATIENT)
Dept: LAB | Facility: CLINIC | Age: 84
End: 2024-02-19
Payer: MEDICARE

## 2024-02-19 ENCOUNTER — OFFICE VISIT (OUTPATIENT)
Dept: FAMILY MEDICINE | Facility: CLINIC | Age: 84
End: 2024-02-19
Payer: MEDICARE

## 2024-02-19 VITALS
OXYGEN SATURATION: 98 % | HEART RATE: 58 BPM | SYSTOLIC BLOOD PRESSURE: 124 MMHG | HEIGHT: 65 IN | BODY MASS INDEX: 28.8 KG/M2 | DIASTOLIC BLOOD PRESSURE: 67 MMHG | WEIGHT: 172.88 LBS

## 2024-02-19 DIAGNOSIS — I10 ESSENTIAL HYPERTENSION, BENIGN: Primary | ICD-10-CM

## 2024-02-19 DIAGNOSIS — E11.65 INADEQUATELY CONTROLLED DIABETES MELLITUS: ICD-10-CM

## 2024-02-19 DIAGNOSIS — E03.9 PRIMARY HYPOTHYROIDISM: ICD-10-CM

## 2024-02-19 DIAGNOSIS — I10 ESSENTIAL HYPERTENSION, BENIGN: ICD-10-CM

## 2024-02-19 DIAGNOSIS — E78.2 MIXED HYPERLIPIDEMIA: ICD-10-CM

## 2024-02-19 DIAGNOSIS — Z00.00 PREVENTATIVE HEALTH CARE: ICD-10-CM

## 2024-02-19 LAB
ALBUMIN SERPL BCP-MCNC: 3.8 G/DL (ref 3.5–5.2)
ALBUMIN/CREAT UR: 7.9 UG/MG (ref 0–30)
ALP SERPL-CCNC: 61 U/L (ref 55–135)
ALT SERPL W/O P-5'-P-CCNC: 13 U/L (ref 10–44)
ANION GAP SERPL CALC-SCNC: 10 MMOL/L (ref 8–16)
AST SERPL-CCNC: 15 U/L (ref 10–40)
BILIRUB SERPL-MCNC: 0.7 MG/DL (ref 0.1–1)
BUN SERPL-MCNC: 12 MG/DL (ref 8–23)
CALCIUM SERPL-MCNC: 9.2 MG/DL (ref 8.7–10.5)
CHLORIDE SERPL-SCNC: 107 MMOL/L (ref 95–110)
CHOLEST SERPL-MCNC: 156 MG/DL (ref 120–199)
CHOLEST/HDLC SERPL: 2.6 {RATIO} (ref 2–5)
CO2 SERPL-SCNC: 25 MMOL/L (ref 23–29)
CREAT SERPL-MCNC: 0.9 MG/DL (ref 0.5–1.4)
CREAT UR-MCNC: 63 MG/DL (ref 15–325)
EST. GFR  (NO RACE VARIABLE): >60 ML/MIN/1.73 M^2
ESTIMATED AVG GLUCOSE: 123 MG/DL (ref 68–131)
GLUCOSE SERPL-MCNC: 99 MG/DL (ref 70–110)
HBA1C MFR BLD: 5.9 % (ref 4–5.6)
HDLC SERPL-MCNC: 61 MG/DL (ref 40–75)
HDLC SERPL: 39.1 % (ref 20–50)
LDLC SERPL CALC-MCNC: 84.4 MG/DL (ref 63–159)
MICROALBUMIN UR DL<=1MG/L-MCNC: 5 UG/ML
NONHDLC SERPL-MCNC: 95 MG/DL
POTASSIUM SERPL-SCNC: 4 MMOL/L (ref 3.5–5.1)
PROT SERPL-MCNC: 7.4 G/DL (ref 6–8.4)
SODIUM SERPL-SCNC: 142 MMOL/L (ref 136–145)
T4 FREE SERPL-MCNC: 1.2 NG/DL (ref 0.71–1.51)
TRIGL SERPL-MCNC: 53 MG/DL (ref 30–150)
TSH SERPL DL<=0.005 MIU/L-ACNC: 0.25 UIU/ML (ref 0.4–4)

## 2024-02-19 PROCEDURE — 80053 COMPREHEN METABOLIC PANEL: CPT | Performed by: INTERNAL MEDICINE

## 2024-02-19 PROCEDURE — 36415 COLL VENOUS BLD VENIPUNCTURE: CPT | Mod: ,,, | Performed by: INTERNAL MEDICINE

## 2024-02-19 PROCEDURE — 99213 OFFICE O/P EST LOW 20 MIN: CPT | Mod: S$GLB,,, | Performed by: INTERNAL MEDICINE

## 2024-02-19 PROCEDURE — 84443 ASSAY THYROID STIM HORMONE: CPT | Performed by: INTERNAL MEDICINE

## 2024-02-19 PROCEDURE — 80061 LIPID PANEL: CPT | Performed by: INTERNAL MEDICINE

## 2024-02-19 PROCEDURE — 83036 HEMOGLOBIN GLYCOSYLATED A1C: CPT | Performed by: INTERNAL MEDICINE

## 2024-02-19 PROCEDURE — 84439 ASSAY OF FREE THYROXINE: CPT | Performed by: INTERNAL MEDICINE

## 2024-02-19 PROCEDURE — 82043 UR ALBUMIN QUANTITATIVE: CPT | Performed by: INTERNAL MEDICINE

## 2024-02-19 RX ORDER — PANTOPRAZOLE SODIUM 40 MG/1
40 TABLET, DELAYED RELEASE ORAL DAILY
COMMUNITY
Start: 2023-12-13

## 2024-02-19 RX ORDER — GABAPENTIN 100 MG/1
100 CAPSULE ORAL 2 TIMES DAILY
COMMUNITY

## 2024-02-19 NOTE — PROGRESS NOTES
Subjective:       Patient ID: Juan Alberto Hendrickson is a 83 y.o. female.    Chief Complaint: Follow-up (Follow up)      Patient is established already .......... presents today for a f/u visit , to discuss labs results and for management of the chronic conditions.        Follow-up  This is a chronic problem. The current episode started more than 1 year ago. The problem occurs constantly. The problem has been unchanged. Pertinent negatives include no fever. Nothing aggravates the symptoms. Treatments tried: See MAR. The treatment provided significant relief.     Review of Systems   Constitutional:  Negative for activity change, fever and unexpected weight change.   Respiratory: Negative.     Cardiovascular: Negative.    Neurological: Negative.          Objective:      Physical Exam  Vitals and nursing note reviewed.   Constitutional:       Appearance: Normal appearance.   Cardiovascular:      Rate and Rhythm: Normal rate and regular rhythm.   Pulmonary:      Effort: Pulmonary effort is normal.      Breath sounds: Normal breath sounds.   Musculoskeletal:      Cervical back: Normal range of motion and neck supple.   Neurological:      Mental Status: She is alert.         Assessment:       1. Essential hypertension, benign  Overview:  Better today    Assessment & Plan:  Get renal fx, electrolytes    Orders:  -     Microalbumin/Creatinine Ratio, Urine; Future; Expected date: 02/19/2024  -     Comprehensive Metabolic Panel; Future; Expected date: 02/19/2024    2. Inadequately controlled diabetes mellitus  Overview:  Usually A1c under control    Assessment & Plan:  Get A1c   Cont diet    Orders:  -     Hemoglobin A1C; Standing    3. Primary hypothyroidism  -     TSH; Future; Expected date: 02/19/2024    4. Mixed hyperlipidemia  Overview:  On statin    Assessment & Plan:  Recheck lipids    Orders:  -     Lipid Panel; Future; Expected date: 02/19/2024    5. Preventative health care  Overview:  See below    Assessment & Plan:  Get  vaccinations records from   Bring all meds next  Check A1c,lipids and urine diab test             Plan:       1. Essential hypertension, benign  Overview:  Better today    Assessment & Plan:  Get renal fx, electrolytes    Orders:  -     Microalbumin/Creatinine Ratio, Urine; Future; Expected date: 02/19/2024  -     Comprehensive Metabolic Panel; Future; Expected date: 02/19/2024    2. Inadequately controlled diabetes mellitus  Overview:  Usually A1c under control    Assessment & Plan:  Get A1c   Cont diet    Orders:  -     Hemoglobin A1C; Standing    3. Primary hypothyroidism  -     TSH; Future; Expected date: 02/19/2024    4. Mixed hyperlipidemia  Overview:  On statin    Assessment & Plan:  Recheck lipids    Orders:  -     Lipid Panel; Future; Expected date: 02/19/2024    5. Preventative health care  Overview:  See below    Assessment & Plan:  Get vaccinations records from   Bring all meds next  Check A1c,lipids and urine diab test

## 2024-02-21 ENCOUNTER — TELEPHONE (OUTPATIENT)
Dept: FAMILY MEDICINE | Facility: CLINIC | Age: 84
End: 2024-02-21
Payer: MEDICARE

## 2024-02-21 NOTE — TELEPHONE ENCOUNTER
----- Message from James Powell MD sent at 2/19/2024  3:27 PM CST -----  Please tell patient that results are acceptable and can wait till next visit. Thank you.

## 2024-03-05 ENCOUNTER — OFFICE VISIT (OUTPATIENT)
Dept: OBSTETRICS AND GYNECOLOGY | Facility: CLINIC | Age: 84
End: 2024-03-05
Payer: MEDICARE

## 2024-03-05 VITALS
BODY MASS INDEX: 28.82 KG/M2 | HEIGHT: 65 IN | SYSTOLIC BLOOD PRESSURE: 124 MMHG | DIASTOLIC BLOOD PRESSURE: 80 MMHG | WEIGHT: 173 LBS

## 2024-03-05 DIAGNOSIS — N76.3 CHRONIC VULVITIS: ICD-10-CM

## 2024-03-05 DIAGNOSIS — Z01.419 ENCOUNTER FOR ANNUAL ROUTINE GYNECOLOGICAL EXAMINATION: Primary | ICD-10-CM

## 2024-03-05 DIAGNOSIS — Z12.31 BREAST CANCER SCREENING BY MAMMOGRAM: ICD-10-CM

## 2024-03-05 PROCEDURE — G0101 CA SCREEN;PELVIC/BREAST EXAM: HCPCS | Mod: S$GLB,,, | Performed by: OBSTETRICS & GYNECOLOGY

## 2024-03-05 RX ORDER — CLOTRIMAZOLE AND BETAMETHASONE DIPROPIONATE 10; .64 MG/G; MG/G
CREAM TOPICAL 2 TIMES DAILY PRN
Qty: 15 G | Refills: 2 | Status: SHIPPED | OUTPATIENT
Start: 2024-03-05

## 2024-03-05 NOTE — PROGRESS NOTES
"Medicare Breast and Pelvic    HPI:  Juan Alberto Hendrickson is a 84 y.o. female  presents for a well woman exam.  LMP: No LMP recorded. Patient has had a hysterectomy..  No issues, problems, or complaints.    Past Medical History:   Diagnosis Date    Diabetes mellitus     Hyperlipidemia     Hypertension     Tachycardia     Thyroid disease      Past Surgical History:   Procedure Laterality Date    CHOLECYSTECTOMY      HYSTERECTOMY      TAHBSO age 31    OOPHORECTOMY Bilateral     BSO with hysterectomy    THYROID SURGERY      TOTAL KNEE REPLACEMENT USING COMPUTER NAVIGATION       Social History     Socioeconomic History    Marital status:    Tobacco Use    Smoking status: Never    Smokeless tobacco: Never   Substance and Sexual Activity    Alcohol use: Not Currently    Sexual activity: Not Currently     Social Determinants of Health     Physical Activity: Inactive (2023)    Exercise Vital Sign     Days of Exercise per Week: 0 days     Minutes of Exercise per Session: 0 min     Family History   Problem Relation Age of Onset    Colon cancer Paternal Uncle     Breast cancer Neg Hx     Ovarian cancer Neg Hx     Uterine cancer Neg Hx      OB History          7    Para   7    Term                AB        Living             SAB        IAB        Ectopic        Multiple        Live Births                     /80 (BP Location: Right arm, Patient Position: Sitting)   Ht 5' 5" (1.651 m)   Wt 78.5 kg (173 lb)   BMI 28.79 kg/m²     ROS:  GENERAL: Denies weight gain or weight loss. Feeling well overall.   SKIN: Denies rash or lesions.   HEAD: Denies head injury or headache.   NODES: Denies enlarged lymph nodes.   CHEST: Denies chest pain or shortness of breath.   CARDIOVASCULAR: Denies palpitations or left sided chest pain.   ABDOMEN: No abdominal pain, constipation, diarrhea, nausea, vomiting or rectal bleeding.   URINARY: No frequency, dysuria, hematuria, or burning on urination.  REPRODUCTIVE: See HPI. "   BREASTS: The patient performs breast self-examination and denies pain, lumps, or nipple discharge.   HEMATOLOGIC: No easy bruisability or excessive bleeding.   MUSCULOSKELETAL: Denies joint pain or swelling.   NEUROLOGIC: Denies syncope or weakness.   PSYCHIATRIC: Denies depression, anxiety or mood swings.    PHYSICAL EXAM:    APPEARANCE: Well nourished, well developed, in no acute distress.  AFFECT: WNL, alert and oriented x 3  SKIN: No acne or hirsutism  NECK: Neck symmetric without masses or thyromegaly  NODES: No inguinal, cervical, axillary, or femoral lymph node enlargement  CHEST: Good respiratory effect  ABDOMEN: Soft.  No tenderness or masses.  No hepatosplenomegaly.  No hernias.  BREASTS: Symmetrical, no skin changes or visible lesions.  No palpable masses, nipple discharge bilaterally.  PELVIC:    V/v atrophic mucosa, no discharge or lesions, no prolapse  No pelvic masses appreciated      ICD-10-CM ICD-9-CM    1. Encounter for annual routine gynecological examination  Z01.419 V72.31       2. Breast cancer screening by mammogram  Z12.31 V76.12 Mammo Digital Screening Bilat w/ Chaz      Mammo Digital Screening Bilat w/ Chaz        Mammogram ordered  Refill Lotrisone p.r.n.

## 2024-03-19 ENCOUNTER — PATIENT OUTREACH (OUTPATIENT)
Dept: ADMINISTRATIVE | Facility: HOSPITAL | Age: 84
End: 2024-03-19
Payer: MEDICARE

## 2024-03-19 ENCOUNTER — OFFICE VISIT (OUTPATIENT)
Dept: FAMILY MEDICINE | Facility: CLINIC | Age: 84
End: 2024-03-19
Payer: MEDICARE

## 2024-03-19 VITALS
DIASTOLIC BLOOD PRESSURE: 89 MMHG | HEART RATE: 69 BPM | WEIGHT: 175.81 LBS | OXYGEN SATURATION: 100 % | BODY MASS INDEX: 29.25 KG/M2 | SYSTOLIC BLOOD PRESSURE: 126 MMHG

## 2024-03-19 DIAGNOSIS — I48.91 ATRIAL FIBRILLATION, UNSPECIFIED TYPE: Primary | ICD-10-CM

## 2024-03-19 DIAGNOSIS — I10 ESSENTIAL HYPERTENSION, BENIGN: ICD-10-CM

## 2024-03-19 DIAGNOSIS — E78.2 MIXED HYPERLIPIDEMIA: ICD-10-CM

## 2024-03-19 DIAGNOSIS — E11.610 DIABETIC NEUROPATHIC ARTHRITIS: ICD-10-CM

## 2024-03-19 DIAGNOSIS — E11.65 INADEQUATELY CONTROLLED DIABETES MELLITUS: ICD-10-CM

## 2024-03-19 PROCEDURE — 99214 OFFICE O/P EST MOD 30 MIN: CPT | Mod: S$GLB,,, | Performed by: INTERNAL MEDICINE

## 2024-03-19 NOTE — Clinical Note
Pls get eye exam report from Dr ABDULLAHI Rutherford in [place] on [address] She saw him last year Tel # [phone]

## 2024-03-19 NOTE — PROGRESS NOTES
Subjective:       Patient ID: Juan Alberto Hendrickson is a 84 y.o. female.    Chief Complaint: Medication Refill (Patient is here for a 6 month follow up, also needs a medication refill. )      Patient is established already .......... presents today for a f/u visit , to discuss labs results and for management of the chronic conditions.        Medication Refill  This is a chronic problem. The current episode started more than 1 year ago. The problem occurs constantly. The problem has been gradually improving. Pertinent negatives include no fever. Nothing aggravates the symptoms. Treatments tried: See MAR.     Review of Systems   Constitutional:  Negative for activity change, fever and unexpected weight change.   Respiratory: Negative.     Cardiovascular: Negative.    Neurological: Negative.          Objective:      Physical Exam  Vitals and nursing note reviewed. Exam conducted with a chaperone present.   Constitutional:       Appearance: Normal appearance.   HENT:      Head: Normocephalic and atraumatic.   Eyes:      Extraocular Movements: Extraocular movements intact.      Pupils: Pupils are equal, round, and reactive to light.   Cardiovascular:      Rate and Rhythm: Normal rate and regular rhythm.      Pulses: Normal pulses.      Heart sounds: Normal heart sounds.   Pulmonary:      Effort: Pulmonary effort is normal.      Breath sounds: Normal breath sounds.   Abdominal:      General: Abdomen is flat. Bowel sounds are normal.      Palpations: Abdomen is soft.   Musculoskeletal:      Cervical back: Normal range of motion and neck supple.   Neurological:      Mental Status: She is alert.         Assessment:       1. Atrial fibrillation, unspecified type    2. Diabetic neuropathic arthritis    3. Essential hypertension, benign  Overview:  Better today    Assessment & Plan:  Monitor  Diet , wt loss , exercise d/w patient        4. Mixed hyperlipidemia  Overview:  On statin    Assessment & Plan:  At goal  Monitor      5.  Inadequately controlled diabetes mellitus  Overview:  Usually A1c under control    Assessment & Plan:  Better  Cont metformin             Plan:       1. Atrial fibrillation, unspecified type    2. Diabetic neuropathic arthritis    3. Essential hypertension, benign  Overview:  Better today    Assessment & Plan:  Monitor  Diet , wt loss , exercise d/w patient        4. Mixed hyperlipidemia  Overview:  On statin    Assessment & Plan:  At goal  Monitor      5. Inadequately controlled diabetes mellitus  Overview:  Usually A1c under control    Assessment & Plan:  Better  Cont metformin

## 2024-03-19 NOTE — LETTER
AUTHORIZATION FOR RELEASE OF   CONFIDENTIAL INFORMATION    Dear Dr Rutherford,    We are seeing Juan Alberto Hendrickson, date of birth 1940, in the clinic at [facility]. James Powell MD is the patient's PCP. Juan Alberto Hendrickson has an outstanding lab/procedure at the time we reviewed her chart. In order to help keep her health information updated, she has authorized us to request the following medical record(s):        (  )  MAMMOGRAM                                      (  )  COLONOSCOPY      (  )  PAP SMEAR                                          (  )  OUTSIDE LAB RESULTS     (  )  DEXA SCAN                                          ( X )  EYE EXAM            (  )  FOOT EXAM                                          (  )  ENTIRE RECORD     (  )  OUTSIDE IMMUNIZATIONS                 (  )  _______________         Please fax records to [facility], Gorgi-Mikhail, Magdy, MD at [phone]    Thanks so much and have a great day!    Kate Wolf LPN [address] [place] [address]  - [phone]  F [phone]           Patient Name: Juan Alberto Hendrickson  : 1940  Patient Phone #: [phone]

## 2024-03-19 NOTE — PROGRESS NOTES
Population Health Chart Review & Patient Outreach Details      Additional Pop Health Notes:               Updates Requested / Reviewed:      Updated Care Coordination Note and Care Team Updated         Health Maintenance Topics Overdue:      [facility] Score: 1     Eye Exam    Tetanus Vaccine  Shingles/Zoster Vaccine                  Health Maintenance Topic(s) Outreach Outcomes & Actions Taken:    Eye Exam - Outreach Outcomes & Actions Taken  : External Records Requested & Care Team Updated if Applicable

## 2024-04-04 ENCOUNTER — PATIENT OUTREACH (OUTPATIENT)
Dept: ADMINISTRATIVE | Facility: HOSPITAL | Age: 84
End: 2024-04-04
Payer: MEDICARE

## 2024-05-20 PROBLEM — Z00.00 PREVENTATIVE HEALTH CARE: Status: RESOLVED | Noted: 2023-06-20 | Resolved: 2024-05-20

## 2024-06-18 RX ORDER — SIMVASTATIN 40 MG/1
40 TABLET, FILM COATED ORAL NIGHTLY
Qty: 90 TABLET | Refills: 1 | Status: SHIPPED | OUTPATIENT
Start: 2024-06-18 | End: 2024-12-15

## 2024-08-12 RX ORDER — FAMOTIDINE 40 MG/1
TABLET, FILM COATED ORAL
Qty: 90 TABLET | Refills: 1 | Status: SHIPPED | OUTPATIENT
Start: 2024-08-12

## 2024-08-12 NOTE — TELEPHONE ENCOUNTER
Refill Routing Note   Medication(s) are not appropriate for processing by [facility] for the following reason(s):        No active prescription written by provider    [facility] action(s):  Defer    Future labs scheduled 9/19/24            Appointments  past 12m or future 3m with PCP    Date Provider   Last Visit   3/19/2024 James Powell MD   Next Visit   9/24/2024 James Powell MD   ED visits in past 90 days: 0        Note composed:3:26 PM 08/12/2024

## 2024-08-12 NOTE — TELEPHONE ENCOUNTER
Care Due:                  Date            Visit Type   Department     Provider  --------------------------------------------------------------------------------                                EP -                              PRIMARY      [facility]  Last Visit: 03-      CARE ([facility])   KEISHA Powell                              EP -                              PRIMARY      [facility]  Next Visit: 09-      CARE ([facility])   [facility]       James Powell                                                            Last  Test          Frequency    Reason                     Performed    Due Date  --------------------------------------------------------------------------------    HBA1C.......  6 months...  metFORMIN................  02- 08-    [facility] Embedded Care Due Messages. Reference number: [id number].   8/12/2024 3:19:34 PM CDT

## 2024-08-30 RX ORDER — LEVOTHYROXINE SODIUM 75 UG/1
75 TABLET ORAL
Qty: 90 TABLET | Refills: 3 | Status: SHIPPED | OUTPATIENT
Start: 2024-08-30 | End: 2025-08-31

## 2024-08-30 NOTE — TELEPHONE ENCOUNTER
No care due was identified.  [facility] Embedded Care Due Messages. Reference number: [id number].   8/29/2024 8:13:18 PM CDT

## 2024-08-30 NOTE — TELEPHONE ENCOUNTER
Refill Routing Note   Medication(s) are not appropriate for processing by [facility] for the following reason(s):        Required labs abnormal: TSH 0.248    [facility] action(s):  Defer               Appointments  past 12m or future 3m with PCP    Date Provider   Last Visit   3/19/2024 James Powell MD   Next Visit   9/24/2024 James Powell MD   ED visits in past 90 days: 0        Note composed:8:13 PM 08/29/2024

## 2024-09-19 ENCOUNTER — LAB VISIT (OUTPATIENT)
Dept: LAB | Facility: CLINIC | Age: 84
End: 2024-09-19
Payer: MEDICARE

## 2024-09-19 DIAGNOSIS — I10 ESSENTIAL HYPERTENSION, BENIGN: ICD-10-CM

## 2024-09-19 DIAGNOSIS — E11.610 DIABETIC NEUROPATHIC ARTHRITIS: ICD-10-CM

## 2024-09-19 LAB
ALBUMIN SERPL BCP-MCNC: 4.1 G/DL (ref 3.5–5.2)
ALBUMIN/CREAT UR: 7.8 UG/MG (ref 0–30)
ALP SERPL-CCNC: 59 U/L (ref 55–135)
ALT SERPL W/O P-5'-P-CCNC: 11 U/L (ref 10–44)
ANION GAP SERPL CALC-SCNC: 9 MMOL/L (ref 8–16)
AST SERPL-CCNC: 19 U/L (ref 10–40)
BILIRUB SERPL-MCNC: 0.7 MG/DL (ref 0.1–1)
BUN SERPL-MCNC: 9 MG/DL (ref 8–23)
CALCIUM SERPL-MCNC: 9.6 MG/DL (ref 8.7–10.5)
CHLORIDE SERPL-SCNC: 106 MMOL/L (ref 95–110)
CO2 SERPL-SCNC: 27 MMOL/L (ref 23–29)
CREAT SERPL-MCNC: 0.9 MG/DL (ref 0.5–1.4)
CREAT UR-MCNC: 116 MG/DL (ref 15–325)
EST. GFR  (NO RACE VARIABLE): >60 ML/MIN/1.73 M^2
ESTIMATED AVG GLUCOSE: 128 MG/DL (ref 68–131)
GLUCOSE SERPL-MCNC: 107 MG/DL (ref 70–110)
HBA1C MFR BLD: 6.1 % (ref 4–5.6)
MICROALBUMIN UR DL<=1MG/L-MCNC: 9 UG/ML
POTASSIUM SERPL-SCNC: 4.6 MMOL/L (ref 3.5–5.1)
PROT SERPL-MCNC: 7.4 G/DL (ref 6–8.4)
SODIUM SERPL-SCNC: 142 MMOL/L (ref 136–145)

## 2024-09-19 PROCEDURE — 36415 COLL VENOUS BLD VENIPUNCTURE: CPT | Mod: ,,, | Performed by: INTERNAL MEDICINE

## 2024-09-19 PROCEDURE — 83036 HEMOGLOBIN GLYCOSYLATED A1C: CPT | Performed by: INTERNAL MEDICINE

## 2024-09-19 PROCEDURE — 80053 COMPREHEN METABOLIC PANEL: CPT | Performed by: INTERNAL MEDICINE

## 2024-09-19 PROCEDURE — 82043 UR ALBUMIN QUANTITATIVE: CPT | Performed by: INTERNAL MEDICINE

## 2024-09-19 PROCEDURE — 82570 ASSAY OF URINE CREATININE: CPT | Performed by: INTERNAL MEDICINE

## 2024-09-24 ENCOUNTER — OFFICE VISIT (OUTPATIENT)
Dept: FAMILY MEDICINE | Facility: CLINIC | Age: 84
End: 2024-09-24
Payer: MEDICARE

## 2024-09-24 VITALS
HEIGHT: 65 IN | WEIGHT: 174 LBS | DIASTOLIC BLOOD PRESSURE: 62 MMHG | SYSTOLIC BLOOD PRESSURE: 122 MMHG | HEART RATE: 63 BPM | OXYGEN SATURATION: 98 % | BODY MASS INDEX: 28.99 KG/M2

## 2024-09-24 DIAGNOSIS — M81.0 OSTEOPOROSIS, POST-MENOPAUSAL: Primary | ICD-10-CM

## 2024-09-24 DIAGNOSIS — Z00.00 ENCOUNTER FOR ANNUAL WELLNESS VISIT (AWV) IN MEDICARE PATIENT: ICD-10-CM

## 2024-09-24 PROCEDURE — 99497 ADVNCD CARE PLAN 30 MIN: CPT | Mod: 33,S$GLB,, | Performed by: INTERNAL MEDICINE

## 2024-09-24 PROCEDURE — G0439 PPPS, SUBSEQ VISIT: HCPCS | Mod: S$GLB,,, | Performed by: INTERNAL MEDICINE

## 2024-09-24 RX ORDER — METOPROLOL TARTRATE 50 MG/1
50 TABLET ORAL DAILY
COMMUNITY
End: 2024-09-24

## 2024-09-24 NOTE — Clinical Note
She'll sched eye exam with Dr Rory Rutherford in [place] on [address] Pls follow up :) Ty I gave her order for a DEXA

## 2024-09-24 NOTE — PATIENT INSTRUCTIONS
Please get the following vaccines from local pharmacy:    Tetanus  Shingles  COVID    Pls schedule your Bone density scan    Pls sched eye exam with Dr Rutherford

## 2024-09-24 NOTE — PROGRESS NOTES
Subjective:       Patient ID: Juan Alberto Hendrickson is a 84 y.o. female.    Chief Complaint: Follow-up      Annual Medicare wellness visit :  Reported by patient.  Diet and nutrition:  Healthy diet  Fracture risk:  No history of fractures; no recent explain infection; no sudden unexplained fractures; previous musculoskeletal injuries  Physical activity:  Exercise on a regular basis; recent increase in physical activity; good  Physical condition  Depression risk:  Never feels sad, empty or tearful; no loss of interest in activities; no significant changes in weight; no sleep disturbances or insomnia; no agitation; no loss of energy; no feelings of worthlessness or guilt; no thoughts of suicide; no history of depression; no history of mood disorders  Orientation:  No disorientation to time; no disorientation to date; no disorientation to place  Concentration and memory:  No decreased concentration ability; no memory lapses or loss; does not forget words  Speech/motor difficulties:  No speech difficulties; no difficulty expressing formulated concepts; no difficulty with fine manipulative tasks; no difficulty writing/coping; no slowed reaction time/; does not knock things over with trying to pick them up  Hearing: No loss of hearing  Vision:  No vision problems  Activities of daily living:  Able to bathe with limited or no assistance; able to control urination and bowels; able to dress with limited or no assistance; able to feed herself with limited or no assistance; able to get out of chair or bed with limited or no assistance; able to Groom with limited or no assistance; able to toilet with limited or no assistance  Instrumental activities of daily living: Able to do housework with limited or no assistance; able to grocery shop with limited or no assistance; able to manage medications with limited or no assistance; able to manage money with limited or no assistance; able to prepare meals with limited or no assistance; able to  use the phone with limited or no assistance  Falls risk assessment: No frequent falls while walking; no 4 in the past year; no falls since last visit; no dizziness/vertigo  Home safety:  No unsafe courtney hazards; no unsafe stairs; no unsafe gas appliances; Working smoke/CO detectors; wears protective head gear for biking/high velocity; use of seatbelts; practicing 'safer sex'; no vision or hearing loss while driving; no firearms; has hand bars in the bathroom/shower; good lighting in the home      Follow-up      Review of Systems Review of system:  Patient reports no dry skin, no itching, no abnormal moles, no jaundice, no rashes, no hives, no discoloration, no excessive facial or body hair between bracket hirsutism, no hair thinning, no growth/lesions, and no excessive sweating; patient reports no fever, no chills, no night sweats, no significant weight gain, no significant weight loss, no exercise intolerance  Patient reports normal appetite and no sleep disturbances (insomnia)  Patient reports no dry eyes, no irritation, no pain in the eyes, no visual changes, no floaters, no sensitivity to light between bracket photophobia, no seeing double between bracket diplopia, and  No discharge.  Patient reports no difficulty hearing, no ear pain, no vertigo and no ringing in the ears between bracket tinnitus.  Patient reports no difficulty smelling, no frequent nosebleeds, and no nose/sinus  Problems.  Patient reports no dry mouth, no unusual taste of foods, no mouth ulcers, no bleeding gums, and oral abnormalities and no teeth problem  Patient reports no sore throat, no difficulty swallowing between bracket dysphagia, no anterior neck pain/tenderness, no snoring no change in voice.  Patient reports no chest pain, no arm pain on exertion, no shortness of breath when walking, no shortness of breath when lying down, no palpitations, no known heart murmur, no lightheadedness, no calf or jaw pains, and no ankle edema.   Patient reports no cough, no nasal congestion, no runny nose, no sinus pressure, no wheezing, no frequent sneezing, no shortness of breath, no rapid breathing, no sputum production and no coughing up blood  Patient reports no nausea, no vomiting, no vomiting blood, no abdominal pain, normal appetite, no diarrhea, no constipation, no dyspepsia/reflux, no heartburn, no early satiety, complete emptying of stool cup, no bloating, able to restrain bowel movements, no bowel urgency and no blood in stools/on toilet paper.  Patient reports no pain during urination, no incontinence, no difficulty urinating, no hematuria, no increased frequency, no feelings of urgency, no flank pains and no urinary tract infections  Patient reports no muscle aches, no muscle weakness, no muscle cramps, no arthralgias/joint pain, no back pains, no swelling in the extremities and no difficulty walking.  Patient reports in dependency.  Patient reports no loss of consciousness, no slurred speech, no weakness, no numbness, no tingling, no tremors, no seizures, no dizziness, no headaches, no memory lapses or changes, no difficulty finding desired words, no loss of balance or falls  Patient reports no irritability, no depression, no anxiety, no panic attacks, no sleep disturbances, feeling safe in her relationship, no paranoia and no thoughts about suicide  Patient reports no fatigue, no cold intolerance, no heat intolerance and no unusual body odor  Patient reports no bruising, no swollen glands, no clotting problems, and no bleeding disorders    Review for Opioid Screening: Pt does not have Rx for Opioids (If patient has Rx list here)      Review for Substance Use Disorders: Patient does not use substance (If patient has Rx list here)           Objective:      Physical Exam  Physical Exam:  Patient is a  year old   Constitutional:  General appearance:  Healthy appearing, well nourished, well developed, and well groomed.  Level of distress:  NAD.   Ambulation:  Ambulating normally.  Psychiatric:  Insight:  Good judgment.  Mental status:  Normal mood and affect an active and alert.  Orientation:  To time, place and person.  Memory:  Recent memory  Normal and remote memory normal.  Head:  Normocephalic, atraumatic, symmetrical, no tenderness, and hair is evenly distributed.  Eyes:  Lids and conjunctivae:  No discharge, pallor or redness and noninjected.  Pupils:  Kept it PERRLA.  Corneas: Grossly intact and fluorescein stain normal.  Funduscopic examination:  Normal vessels and optic discs, no exudates or hemorrhages and grossly normal except where noted.  Capital E OM: Intact.  Lungs: Clear.  Sclera: Nonicteric.  Vision: Peripheral vision grossly intact in acuity grossly intact.  Optic disc:  Normal appearance and vessels and no exudates or hemorrhages  ENMT:  Ears:  No lesions on external ear, EACs clear.  TMs clear.  TM mobility normal and normal general appearance.  Hearing:  No hearing loss and Rinne: AC > BC.  Nose:  No polyps, lesions on external nose, septal deviation, sinus tenderness, or nasal discharge; nasal passages clear mucosa pink; and nares patent.  Lips teeth and gums: No mouth or lip ulcers or bleeding.  Gums are normal dentition normal.  Oropharynx:  No erythema exudates or ulcers and moist mucous membranes.  Tonsils not enlarged.  Oral mucosa and salivary glands normal  Neck:  Neck is supple, symmetrical, trachea midline and no masses.  Lymph nodes:  No cervical lymphadenopathy.  Thyroid no enlargement or nodules and nontender  Lungs:  Respiratory effort no dyspnea.  Percussion: No dullness, flatness or hyper-resonance.  Auscultation:  No wheezing, rales/crackles, no rhonchi and breath sounds normal, good air movement, and clear to auscultation except as noted.  Chest deformity: normal thoracic no deformities  Cardiovascular:  Apical pulse: Nondisplaced.  Heart auscultation:  Normal S1 and S2; no murmurs rubs or gallops; and RRR.  Neck  vessels: No carotid bruit on the right or left and no JVDs or hepatojugular reflux.  Arterial pulses normal on the right and left radial femoral and dorsalis pedis and posterior tibial.  Varicosities right and left non-existent and capillary refill test normal.  Edema none: on the right or left  Breast: deferred  Abdomen:  Bowel sounds normal.  Inspection and palpation:  No tenderness guarding masses rebound tenderness or CVA tenderness and soft and nondistended.  Liver column nontender and no hepatomegaly.  Spleen:  Nontender and no splenomegaly.  Hernia: Nonpalpable  Female :  Deferred  Rectal:  Deferred  Musculoskeletal:  Motor strength and tone:  Normal and normal tone.  Joints bones and muscles:  No contractures malalignment, tenderness or bony abnormalities and normal movement of all extremities and posture is normal.  Extremities:  No cyanosis clubbing or palpable cords  Neurological examination: Gait and station:  Normal gait and station.  Cranial nerves:  Grossly intact.  Sensation:  Monofilament test intact and normal and grossly intact.  Reflexes:  DTRs 2+ bilaterally throughout.  Coordination and cerebellum: no intention tremors, no resting tremors. Romberg's sign: negative. Motor strength normal on the right and left.  Sensation normal on the right and left side.  No pain/temperature decrease on the legs and dorsum of the feet.  No tactile decreased on the leg and dorsum of the feet.  No vibration- perception threshold decrease  Skin:  Inspection and palpation: No rash, lesions, ulcers, nodules, jaundice or abnormal nevi and good turgor.  Nails:  Normal.  Right and left lower extremities column normal  Back: Thoracolumbar appearance: Normal curvature  Foot examination:  Right and left feet were examined, and toes were examined:  No deformity, inter digital erythema, or nail changes or disorders and digital hair present and normal motion.     I offered to discuss end of life issues, including  information on how to make advance directives that the patient could use to name someone who would make medical decisions on their behalf if they became too ill to make themselves.      __Patient declined       _X__Patient is interested, I provided paperwork and offered to discuss       Assessment:            Plan:     1. Osteoporosis, post-menopausal  -     DXA Bone Density Axial Skeleton 1 or more sites; Future; Expected date: 09/24/2024    2. Encounter for annual wellness visit (AWV) in Medicare patient  Overview:  AWV    Assessment & Plan:  I gave the patient written recommendations re the outstanding vaccinations with the AVS  Diet , wt loss , exercise d/w patient  I gave the patient a copy of the AMD  We discussed tobacco, ETOH & physical activity  Dep screen : neg  She got this years flu shot  Keep your eye evens with Dr Rutherford next month  Call [phone] to sched the DEXA scan              Advance Care Planning     Date: 09/24/2024    Power of   I initiated the process of voluntary advance care planning today and explained the importance of this process to the patient.  I introduced the concept of advance directives to the patient, as well. Then the patient received detailed information about the importance of designating a Health Care Power of  (HCPOA). She was also instructed to communicate with this person about their wishes for future healthcare, should she become sick and lose decision-making capacity. The patient has not previously appointed a HCPOA. After our discussion, the patient has decided to complete a HCPOA and has appointed her son, health care agent:  Vadim Muir  & health care agent number:  [phone] . I encouraged her to communicate with this person about their wishes for future healthcare, should she become sick and lose decision-making capacity.      A total of 16 min was spent on advance care planning, goals of care discussion, emotional support, formulating and  communicating prognosis and exploring burden/benefit of various approaches of treatment. This discussion occurred on a fully voluntary basis with the verbal consent of the patient and/or family.

## 2024-09-24 NOTE — ASSESSMENT & PLAN NOTE
I gave the patient written recommendations re the outstanding vaccinations with the AVS  Diet , wt loss , exercise d/w patient  I gave the patient a copy of the AMD  We discussed tobacco, ETOH & physical activity  Dep screen : neg  She got this years flu shot  Keep your eye evens with Dr Rutherford next month  Call [phone] to sched the DEXA scan

## 2024-09-30 RX ORDER — METOPROLOL SUCCINATE 50 MG/1
50 TABLET, EXTENDED RELEASE ORAL
Qty: 90 TABLET | Refills: 3 | Status: SHIPPED | OUTPATIENT
Start: 2024-09-30

## 2024-09-30 RX ORDER — METFORMIN HYDROCHLORIDE 500 MG/1
500 TABLET, EXTENDED RELEASE ORAL NIGHTLY
Qty: 90 TABLET | Refills: 1 | Status: SHIPPED | OUTPATIENT
Start: 2024-09-30

## 2024-09-30 NOTE — TELEPHONE ENCOUNTER
Refill Decision Note   Savannaanna Hendrickson  is requesting a refill authorization.  Brief Assessment and Rationale for Refill:  Approve     Medication Therapy Plan:        Comments:     Note composed:1:56 PM 09/30/2024

## 2024-09-30 NOTE — TELEPHONE ENCOUNTER
No care due was identified.  [facility] Embedded Care Due Messages. Reference number: [id number].   9/30/2024 1:30:25 PM CDT

## 2024-10-10 ENCOUNTER — TELEPHONE (OUTPATIENT)
Dept: FAMILY MEDICINE | Facility: CLINIC | Age: 84
End: 2024-10-10
Payer: MEDICARE

## 2024-10-10 NOTE — TELEPHONE ENCOUNTER
----- Message from Zoey sent at 10/10/2024  1:58 PM CDT -----  Contact: pt  Type: Needs Medical Advice         Who Called: PT  Best Call Back Number:[phone]  Additional Information: Requesting a call back regarding Pt is wanting to go to [facility] to have DXA Bone Density. Pt asking for orders to be sent there,   Please Advise- Thank you

## 2024-10-14 LAB
LEFT EYE DM RETINOPATHY: NEGATIVE
RIGHT EYE DM RETINOPATHY: NEGATIVE

## 2024-10-22 ENCOUNTER — PATIENT OUTREACH (OUTPATIENT)
Dept: ADMINISTRATIVE | Facility: HOSPITAL | Age: 84
End: 2024-10-22
Payer: MEDICARE

## 2024-10-22 NOTE — PROGRESS NOTES
Population Health Chart Review & Patient Outreach Details      Additional Pop Health Notes:               Updates Requested / Reviewed:      Updated Care Coordination Note and Care Team Updated         Health Maintenance Topics Overdue:      [facility] Score: 1     Eye Exam    Tetanus Vaccine  Shingles/Zoster Vaccine                  Health Maintenance Topic(s) Outreach Outcomes & Actions Taken:    Eye Exam - Outreach Outcomes & Actions Taken  : External Records Requested & Care Team Updated if Applicable

## 2024-10-23 RX ORDER — METOPROLOL SUCCINATE 50 MG/1
50 TABLET, EXTENDED RELEASE ORAL
Qty: 90 TABLET | Refills: 0 | Status: SHIPPED | OUTPATIENT
Start: 2024-10-23

## 2024-10-23 NOTE — TELEPHONE ENCOUNTER
Radha Pinto,  Please review this Rx refill request for this patient of Dr. Burgess in absence this week.  Last office visit: 9/24/2024  Upcoming Appointment: 3/24/2024  Please review and advise.  Thank you.  Signed:  Peter Acevedo LPN      ----- Message from Mel sent at 10/23/2024  2:16 PM CDT -----  Contact: Patient  Type:  Needs Medical Advice    Who Called: Patient      Pharmacy name and phone #:    [facility] [id number]  [place], [place] - [address]  [address]  [facility] [address]  Phone: [phone] Fax: [phone]      Would the patient rather a call back or a response via [facility]? Call    Best Call Back Number: [phone] (home)     Additional Information: metoprolol succinate (TOPROL-XL) 50 MG 24 hr tablet    Patient states that her med above still has not been filled. States that the pharmacy does not have any refills

## 2024-10-26 ENCOUNTER — PATIENT OUTREACH (OUTPATIENT)
Dept: ADMINISTRATIVE | Facility: HOSPITAL | Age: 84
End: 2024-10-26
Payer: MEDICARE

## 2024-11-29 RX ORDER — SIMVASTATIN 40 MG/1
40 TABLET, FILM COATED ORAL NIGHTLY
Qty: 90 TABLET | Refills: 0 | Status: SHIPPED | OUTPATIENT
Start: 2024-11-29

## 2024-11-29 NOTE — TELEPHONE ENCOUNTER
Care Due:                  Date            Visit Type   Department     Provider  --------------------------------------------------------------------------------                                EP -                              PRIMARY      [facility]  Last Visit: 09-      CARE ([facility])   KEISHA Powell                              EP -                              PRIMARY      [facility]  Next Visit: 03-      CARE ([facility])   [facility]       James Powell                                                            Last  Test          Frequency    Reason                     Performed    Due Date  --------------------------------------------------------------------------------    Lipid Panel.  12 months..  simvastatin..............  02- 02-    TSH.........  12 months..  levothyroxine............  02- 02-    [facility] Embedded Care Due Messages. Reference number: [id number].   11/29/2024 1:29:04 PM CST

## 2024-11-30 NOTE — TELEPHONE ENCOUNTER
Refill Decision Note   Savannaanna Emeka  is requesting a refill authorization.  Brief Assessment and Rationale for Refill:  Approve     Medication Therapy Plan:        Comments:     Note composed:8:23 PM 11/29/2024

## 2025-01-17 RX ORDER — FAMOTIDINE 40 MG/1
TABLET, FILM COATED ORAL
Qty: 90 TABLET | Refills: 2 | Status: SHIPPED | OUTPATIENT
Start: 2025-01-17

## 2025-01-17 NOTE — TELEPHONE ENCOUNTER
Care Due:                  Date            Visit Type   Department     Provider  --------------------------------------------------------------------------------                                EP -                              PRIMARY      [facility] FAM[facility]  Last Visit: 09-      CARE ([facility])   KEISHA Powell                              EP -                              PRIMARY      [facility]  Next Visit: 03-      CARE ([facility])   KEISHA Powell                                                            Last  Test          Frequency    Reason                     Performed    Due Date  --------------------------------------------------------------------------------    HBA1C.......  6 months...  metFORMIN................  09- 03-    [facility] Embedded Care Due Messages. Reference number: [id number].   1/17/2025 9:15:54 AM CST

## 2025-01-17 NOTE — TELEPHONE ENCOUNTER
Provider Staff:  Action required for this patient    Requires labs      Please see care gap opportunities below in Care Due Message.    Thanks!  [facility]     Appointments      Date Provider   Last Visit   9/24/2024 James Powell MD   Next Visit   3/24/2025 James Powell MD     Refill Decision Note   Juan Alberto Hendrickson  is requesting a refill authorization.  Brief Assessment and Rationale for Refill:  Approve     Medication Therapy Plan:        Comments:     Note composed:9:35 AM 01/17/2025

## 2025-02-25 RX ORDER — SIMVASTATIN 40 MG/1
40 TABLET, FILM COATED ORAL NIGHTLY
Qty: 90 TABLET | Refills: 0 | Status: SHIPPED | OUTPATIENT
Start: 2025-02-25

## 2025-02-25 NOTE — TELEPHONE ENCOUNTER
Care Due:                  Date            Visit Type   Department     Provider  --------------------------------------------------------------------------------                                EP -                              PRIMARY      [facility] FAM[facility]  Last Visit: 09-      CARE ([facility])   KEISHA Powell                              EP -                              PRIMARY      [facility]  Next Visit: 03-      CARE ([facility])   [facility]       James Powell                                                            Last  Test          Frequency    Reason                     Performed    Due Date  --------------------------------------------------------------------------------    Lipid Panel.  12 months..  simvastatin..............  02- 02-    TSH.........  12 months..  levothyroxine............  02- 02-    [facility] Embedded Care Due Messages. Reference number: [id number].   2/25/2025 11:45:36 AM CST

## 2025-02-25 NOTE — TELEPHONE ENCOUNTER
Refill Routing Note   Medication(s) are not appropriate for processing by [facility] for the following reason(s):        Required labs outdated    [facility] action(s):  Defer   Requires labs : Yes             Appointments  past 12m or future 3m with PCP    Date Provider   Last Visit   9/24/2024 James Powell MD   Next Visit   3/24/2025 James Powell MD   ED visits in past 90 days: 0        Note composed:11:48 AM 02/25/2025

## 2025-03-18 ENCOUNTER — TELEPHONE (OUTPATIENT)
Dept: FAMILY MEDICINE | Facility: CLINIC | Age: 85
End: 2025-03-18
Payer: MEDICARE

## 2025-03-18 NOTE — TELEPHONE ENCOUNTER
----- Message from Mel sent at 3/18/2025 10:59 AM CDT -----  Contact: PAtient  Type:  Needs Medical AdviceWho Called: PatientWould the patient rather a call back or a response via [facility]? CallBest Call Back Number: [phone] Information: Patient is requesting a call regarding if she needs labs or not prior to appt

## 2025-03-25 ENCOUNTER — OFFICE VISIT (OUTPATIENT)
Dept: FAMILY MEDICINE | Facility: CLINIC | Age: 85
End: 2025-03-25
Payer: MEDICARE

## 2025-03-25 VITALS
OXYGEN SATURATION: 99 % | WEIGHT: 165.88 LBS | HEART RATE: 68 BPM | HEIGHT: 65 IN | RESPIRATION RATE: 15 BRPM | BODY MASS INDEX: 27.64 KG/M2 | DIASTOLIC BLOOD PRESSURE: 70 MMHG | SYSTOLIC BLOOD PRESSURE: 106 MMHG

## 2025-03-25 DIAGNOSIS — N76.3 CHRONIC VULVITIS: ICD-10-CM

## 2025-03-25 DIAGNOSIS — S43.421A SPRAIN OF RIGHT ROTATOR CUFF CAPSULE, INITIAL ENCOUNTER: ICD-10-CM

## 2025-03-25 DIAGNOSIS — I10 ESSENTIAL HYPERTENSION, BENIGN: ICD-10-CM

## 2025-03-25 DIAGNOSIS — M25.511 CHRONIC RIGHT SHOULDER PAIN: ICD-10-CM

## 2025-03-25 DIAGNOSIS — E78.2 MIXED HYPERLIPIDEMIA: Primary | ICD-10-CM

## 2025-03-25 DIAGNOSIS — Z00.00 PREVENTATIVE HEALTH CARE: ICD-10-CM

## 2025-03-25 DIAGNOSIS — E03.9 PRIMARY HYPOTHYROIDISM: ICD-10-CM

## 2025-03-25 DIAGNOSIS — G89.29 CHRONIC RIGHT SHOULDER PAIN: ICD-10-CM

## 2025-03-25 DIAGNOSIS — E11.610 DIABETIC NEUROPATHIC ARTHRITIS: ICD-10-CM

## 2025-03-25 DIAGNOSIS — E11.9 DIABETES MELLITUS WITHOUT COMPLICATION: ICD-10-CM

## 2025-03-25 PROBLEM — I48.91 ATRIAL FIBRILLATION, UNSPECIFIED TYPE: Status: RESOLVED | Noted: 2024-03-19 | Resolved: 2025-03-25

## 2025-03-25 PROCEDURE — 99214 OFFICE O/P EST MOD 30 MIN: CPT | Mod: S$GLB,,, | Performed by: INTERNAL MEDICINE

## 2025-03-25 PROCEDURE — G2211 COMPLEX E/M VISIT ADD ON: HCPCS | Mod: S$GLB,,, | Performed by: INTERNAL MEDICINE

## 2025-03-25 RX ORDER — CLOTRIMAZOLE AND BETAMETHASONE DIPROPIONATE 10; .64 MG/G; MG/G
CREAM TOPICAL 2 TIMES DAILY PRN
Qty: 15 G | Refills: 2 | Status: SHIPPED | OUTPATIENT
Start: 2025-03-25 | End: 2025-06-23

## 2025-03-25 NOTE — PROGRESS NOTES
Subjective:       Patient ID: Juan Alberto Hendrickson is a 85 y.o. female.    Chief Complaint: Health Maintenance (6m f/u) and Medication Refill (Lotrisone cream)      History of Present Illness    CHIEF COMPLAINT:  Juan Alberto presents for a routine follow-up visit and to discuss recent bone density scan results.    HPI:  Juan Alberto recently underwent a bone density scan at [place], which revealed osteopenia. Juan Alberto takes calcium citrate with 500 units twice daily, morning and evening, along with vitamin D supplements for bone health.    Juan Alberto reports joint pain, particularly in her thigh. Her knees are functioning well, and she can ambulate without significant issues. 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    Juan Alberto reports shoulder pain, possibly related to her rotator cuff. The pain began after sitting in chairs without armrests at an event, where she had to use her arms to stand up. She was previously prescribed naproxen for this issue.    Juan Alberto denies significant knee pain or unsteadiness while walking.    MEDICATIONS:  Juan Alberto is on thyroid hormone, Metformin, Metoprolol, and Simvastatin. She is also taking calcium citrate, 500 units twice daily (morning and evening), for bone health, as well as a multivitamin.    MEDICAL HISTORY:  Juan Alberto has a history of osteopenia, thyroid condition, diabetes, hypertension, and hyperlipidemia. Juan Alberto has not had a hysterectomy. Her last Pap smear was conducted previously, but the exact date is not specified.    SURGICAL HISTORY:  Juan Alberto has undergone bilateral knee replacement, with one knee replacement later revised in [place]. She has also had shoulder surgery, though the details are not specified.    TEST RESULTS:  In September, the patient underwent a comprehensive metabolic panel, which showed normal renal function, lipid panel, and glucose levels. A diabetes test conducted in the same month yielded normal results. Juan Alberto had a bone  density scan on October 16, which revealed osteopenia.    IMAGING:  She recently underwent a shoulder X-ray.         Review of Systems   Constitutional:  Negative for activity change, fever and unexpected weight change.   Respiratory: Negative.     Cardiovascular: Negative.    Musculoskeletal:  Positive for arthralgias and myalgias.        R shoulder stiffness, dec ROM   Neurological: Negative.          Objective:      Physical Exam  Vitals and nursing note reviewed.   Constitutional:       Appearance: Normal appearance.      Comments: Elderly frail AA female   Cardiovascular:      Rate and Rhythm: Normal rate and regular rhythm.   Pulmonary:      Effort: Pulmonary effort is normal.      Breath sounds: Normal breath sounds.   Musculoskeletal:      Cervical back: Normal range of motion and neck supple.      Comments: Dec ROM of R shoulder   Neurological:      Mental Status: She is alert.         Assessment:       1. Mixed hyperlipidemia  Overview:  On statin    Orders:  -     Lipid Panel; Future; Expected date: 03/25/2025  -     Comprehensive Metabolic Panel; Future; Expected date: 03/25/2025  -     Hemoglobin A1C; Future; Expected date: 03/25/2025  -     Microalbumin/Creatinine Ratio, Urine; Future; Expected date: 03/25/2025    2. Essential hypertension, benign  Overview:  Better today    Assessment & Plan:  Cont current meds  Get labs    Orders:  -     Lipid Panel; Future; Expected date: 03/25/2025  -     Comprehensive Metabolic Panel; Future; Expected date: 03/25/2025  -     Hemoglobin A1C; Future; Expected date: 03/25/2025  -     Microalbumin/Creatinine Ratio, Urine; Future; Expected date: 03/25/2025    3. Diabetes mellitus without complication  -     Lipid Panel; Future; Expected date: 03/25/2025  -     Comprehensive Metabolic Panel; Future; Expected date: 03/25/2025  -     Hemoglobin A1C; Future; Expected date: 03/25/2025  -     Microalbumin/Creatinine Ratio, Urine; Future; Expected date: 03/25/2025    4.  Chronic vulvitis  -     clotrimazole-betamethasone 1-0.05% (LOTRISONE) cream; Apply topically 2 (two) times daily as needed (Rash).  Dispense: 15 g; Refill: 2    5. Diabetic neuropathic arthritis  Assessment & Plan:  Stable      6. Sprain of right rotator cuff capsule, initial encounter  -     PT/OT Plan of Care; Future    7. Primary hypothyroidism  -     TSH; Future; Expected date: 03/25/2025    8. Preventative health care  Overview:  See below    Assessment & Plan:  I gave the patient verbal recommendations re the outstanding vaccinations.        9. Chronic right shoulder pain  Overview:  Rotator cuff  ? Frozen shoulder    Assessment & Plan:  Aleve   Ref to PT/OT             Plan:       Assessment & Plan    E11.610 Diabetic neuropathic arthritis  I48.91 Atrial fibrillation, unspecified type  I10 Essential (primary) hypertension  E78.5 Hyperlipidemia, unspecified  E03.9 Hypothyroidism, unspecified  M85.80 Other specified disorders of bone density and structure, unspecified site  M25.511 Pain in right shoulder  M79.651 Pain in right thigh  Z96.651 Presence of right artificial knee joint  Z96.652 Presence of left artificial knee joint  Z79.84 Long term (current) use of oral hypoglycemic drugs    IMPRESSION:  - Reviewed DEXA scan results from October 16, indicating weak bones but not osteoporosis.  - Considered current calcium and vitamin D intake for bone health.  - Evaluated thyroid, diabetes, cholesterol, and cardiovascular health, including a-fib risk.  - Considered joint health, particularly knees and shoulder.  - Reviewed vaccination status, noting potential need for shingles, tetanus, and new COVID vaccines.  - Determined routine GYN exams unnecessary at this time.  - Explained MRI typically only needed if surgery being considered for shoulder issues.    DIABETIC NEUROPATHIC ARTHRITIS:  - Reviewed September labs, which showed good glucose levels and diabetes test results.  - Continued prescription of metformin  for diabetes management.    ATRIAL FIBRILLATION, UNSPECIFIED TYPE:  - Performed a quick auscultation to check for regular heartbeat and inquired about the patient's past diagnosis of atrial fibrillation.  - Continued prescription of metoprolol for heart-related issues and instructed the patient to take it as prescribed.    HYPERTENSION:  - Continued prescription of metoprolol for blood pressure control.    HYPERLIPIDEMIA:  - Reviewed the patient's last cholesterol check from September, which showed good results.  - Continued prescription of simvastatin for cholesterol management.    HYPOTHYROIDISM:  - Continued prescription of thyroid hormone medication.    BONE DENSITY DISORDER:  - Reviewed the patient's bone density scan from October 16, which showed weak bones but not osteoporosis.  - Educated the patient about the importance of calcium and vitamin D supplementation for bone health as we age, and explained the difference between weak bones and osteoporosis.  - Prescribed calcium supplement (Citracal) 500 units, 1 in the morning and 1 in the evening with current multivitamin.  - Suggested considering medication for bone health in the future.    RIGHT SHOULDER PAIN:  - Noted the patient's report of pain in right shoulder, possibly from improper posture when standing up from a chair without armrests.  - Considered the possibility of rotator cuff injury or frozen shoulder.  - Recommend physical therapy instead of surgery due to the patient's age.  - Prescribed naproxen (Aleve) for pain management and referred the patient to Physical Therapy.  - Instructed the patient to use proper posture when standing up to avoid exacerbating shoulder pain.    RIGHT THIGH PAIN:  - Noted the patient's report of thigh pain when asked about arthritis or joint pain.    ARTIFICIAL KNEE JOINTS:  - Noted that the patient has had both knees replaced, including a revision of the right knee replacement.  - Confirmed that the patient reports  no current problems with the knee replacements.       Juan Alberto was seen today for health maintenance and medication refill.    Diagnoses and all orders for this visit:    Mixed hyperlipidemia  -     Lipid Panel; Future  -     Comprehensive Metabolic Panel; Future  -     Hemoglobin A1C; Future  -     Microalbumin/Creatinine Ratio, Urine; Future  -     Lipid Panel  -     Comprehensive Metabolic Panel  -     Hemoglobin A1C  -     Microalbumin/Creatinine Ratio, Urine    Essential hypertension, benign  -     Lipid Panel; Future  -     Comprehensive Metabolic Panel; Future  -     Hemoglobin A1C; Future  -     Microalbumin/Creatinine Ratio, Urine; Future  -     Lipid Panel  -     Comprehensive Metabolic Panel  -     Hemoglobin A1C  -     Microalbumin/Creatinine Ratio, Urine    Diabetes mellitus without complication  -     Lipid Panel; Future  -     Comprehensive Metabolic Panel; Future  -     Hemoglobin A1C; Future  -     Microalbumin/Creatinine Ratio, Urine; Future  -     Lipid Panel  -     Comprehensive Metabolic Panel  -     Hemoglobin A1C  -     Microalbumin/Creatinine Ratio, Urine    Chronic vulvitis  -     clotrimazole-betamethasone 1-0.05% (LOTRISONE) cream; Apply topically 2 (two) times daily as needed (Rash).    Diabetic neuropathic arthritis    Sprain of right rotator cuff capsule, initial encounter  -     PT/OT Plan of Care; Future    Primary hypothyroidism  -     TSH; Future  -     TSH    Preventative health care    Chronic right shoulder pain          Visit today included increased complexity associated with the care of the episodic problem.   I addressed and managing the longitudinal care of the patient due to the serious and/or complex managed problem(s).  .

## 2025-04-07 RX ORDER — METFORMIN HYDROCHLORIDE 500 MG/1
500 TABLET, EXTENDED RELEASE ORAL NIGHTLY
Qty: 90 TABLET | Refills: 0 | Status: SHIPPED | OUTPATIENT
Start: 2025-04-07

## 2025-04-07 NOTE — TELEPHONE ENCOUNTER
Care Due:                  Date            Visit Type   Department     Provider  --------------------------------------------------------------------------------                                EP -                              PRIMARY      [facility]  Last Visit: 03-      CARE ([facility])   MEDICINE       James Powell  Next Visit: None Scheduled  None         None Found                                                            Last  Test          Frequency    Reason                     Performed    Due Date  --------------------------------------------------------------------------------    HBA1C.......  6 months...  metFORMIN................  09- 03-    [facility] Embedded Care Due Messages. Reference number: [id number].   4/07/2025 11:12:50 AM CDT

## 2025-04-07 NOTE — TELEPHONE ENCOUNTER
Refill Routing Note   Medication(s) are not appropriate for processing by [facility] for the following reason(s):        Required labs outdated    [facility] action(s):  Defer   Requires labs : Yes             Appointments  past 12m or future 3m with PCP    Date Provider   Last Visit   3/25/2025 James Powell MD   Next Visit   Visit date not found James Powell MD   ED visits in past 90 days: 0        Note composed:2:20 PM 04/07/2025           Principal Discharge DX:	Chest pain

## 2025-04-14 LAB
LEFT EYE DM RETINOPATHY: NEGATIVE
RIGHT EYE DM RETINOPATHY: NEGATIVE

## 2025-04-22 ENCOUNTER — PATIENT OUTREACH (OUTPATIENT)
Dept: ADMINISTRATIVE | Facility: HOSPITAL | Age: 85
End: 2025-04-22
Payer: MEDICARE

## 2025-04-22 NOTE — PROGRESS NOTES
Population Health Chart Review & Patient Outreach Details      Additional [facility] Health Notes:               Updates Requested / Reviewed:      Updated Care Coordination Note         Health Maintenance Topics Overdue:      VB Score: 0     Patient is not due for any topics at this time.    Tetanus Vaccine  Shingles/Zoster Vaccine                  Health Maintenance Topic(s) Outreach Outcomes & Actions Taken:    Eye Exam - Outreach Outcomes & Actions Taken  : Diabetic Eye External Records Uploaded, Care Team & History Updated if Applicable

## 2025-07-08 RX ORDER — LEVOTHYROXINE SODIUM 75 UG/1
75 TABLET ORAL
Qty: 90 TABLET | Refills: 0 | Status: SHIPPED | OUTPATIENT
Start: 2025-07-08

## 2025-07-08 RX ORDER — METFORMIN HYDROCHLORIDE 500 MG/1
500 TABLET, EXTENDED RELEASE ORAL NIGHTLY
Qty: 90 TABLET | Refills: 0 | Status: SHIPPED | OUTPATIENT
Start: 2025-07-08

## 2025-07-21 RX ORDER — LEVOTHYROXINE SODIUM 75 UG/1
75 TABLET ORAL
Qty: 90 TABLET | Refills: 0 | Status: SHIPPED | OUTPATIENT
Start: 2025-07-21 | End: 2025-07-23 | Stop reason: SDUPTHER

## 2025-07-21 RX ORDER — METFORMIN HYDROCHLORIDE 500 MG/1
500 TABLET, EXTENDED RELEASE ORAL NIGHTLY
Qty: 90 TABLET | Refills: 0 | Status: SHIPPED | OUTPATIENT
Start: 2025-07-21 | End: 2025-07-23 | Stop reason: SDUPTHER

## 2025-07-21 RX ORDER — METOPROLOL SUCCINATE 50 MG/1
50 TABLET, EXTENDED RELEASE ORAL DAILY
Qty: 90 TABLET | Refills: 0 | Status: SHIPPED | OUTPATIENT
Start: 2025-07-21 | End: 2025-07-23 | Stop reason: SDUPTHER

## 2025-07-21 NOTE — TELEPHONE ENCOUNTER
Radha Cintron,  Please review this Rx refill request for this former patient of Dr. Burgess who is schedule to see on for est care on 8/28/25 @ 0900 AM.  Last office visit: 3/25/25  Thank you  Peter Acevedo LPN  Copied from CRM #[id number]. Topic: General Inquiry - Patient Advice  >> Jul 18, 2025  1:21 PM Savage wrote:  Type:  Needs Medical Advice    Who Called: Pt Son  Would the patient rather a call back or a response via [facility]? Call  Best Call Back Number: Pt Son [phone]  Additional Information:   Pt needs test results from blood work and schedule new pt appt

## 2025-07-23 ENCOUNTER — OFFICE VISIT (OUTPATIENT)
Dept: FAMILY MEDICINE | Facility: CLINIC | Age: 85
End: 2025-07-23
Payer: MEDICARE

## 2025-07-23 ENCOUNTER — HOSPITAL ENCOUNTER (OUTPATIENT)
Dept: RADIOLOGY | Facility: HOSPITAL | Age: 85
Discharge: HOME | End: 2025-07-23
Attending: FAMILY MEDICINE
Payer: MEDICARE

## 2025-07-23 VITALS
DIASTOLIC BLOOD PRESSURE: 60 MMHG | HEIGHT: 65 IN | WEIGHT: 160.63 LBS | BODY MASS INDEX: 26.76 KG/M2 | HEART RATE: 61 BPM | SYSTOLIC BLOOD PRESSURE: 140 MMHG | OXYGEN SATURATION: 98 %

## 2025-07-23 DIAGNOSIS — E78.2 MIXED HYPERLIPIDEMIA: ICD-10-CM

## 2025-07-23 DIAGNOSIS — G89.29 CHRONIC RIGHT SHOULDER PAIN: ICD-10-CM

## 2025-07-23 DIAGNOSIS — I10 ESSENTIAL HYPERTENSION, BENIGN: Primary | ICD-10-CM

## 2025-07-23 DIAGNOSIS — M25.511 CHRONIC RIGHT SHOULDER PAIN: ICD-10-CM

## 2025-07-23 DIAGNOSIS — K21.9 GASTROESOPHAGEAL REFLUX DISEASE WITHOUT ESOPHAGITIS: ICD-10-CM

## 2025-07-23 DIAGNOSIS — E11.65 CONTROLLED TYPE 2 DIABETES MELLITUS WITH HYPERGLYCEMIA, WITHOUT LONG-TERM CURRENT USE OF INSULIN: ICD-10-CM

## 2025-07-23 DIAGNOSIS — E03.9 HYPOTHYROIDISM, UNSPECIFIED TYPE: ICD-10-CM

## 2025-07-23 PROCEDURE — 73030 X-RAY EXAM OF SHOULDER: CPT | Mod: 26,RT,, | Performed by: RADIOLOGY

## 2025-07-23 PROCEDURE — 99999 PR PBB SHADOW E&M-EST. PATIENT-LVL III: CPT | Mod: PBBFAC,,, | Performed by: FAMILY MEDICINE

## 2025-07-23 PROCEDURE — 73030 X-RAY EXAM OF SHOULDER: CPT | Mod: TC,RT

## 2025-07-23 PROCEDURE — 99213 OFFICE O/P EST LOW 20 MIN: CPT | Mod: PBBFAC,25 | Performed by: FAMILY MEDICINE

## 2025-07-23 RX ORDER — METFORMIN HYDROCHLORIDE 500 MG/1
500 TABLET, EXTENDED RELEASE ORAL NIGHTLY
Qty: 90 TABLET | Refills: 1 | Status: SHIPPED | OUTPATIENT
Start: 2025-07-23

## 2025-07-23 RX ORDER — METOPROLOL SUCCINATE 50 MG/1
50 TABLET, EXTENDED RELEASE ORAL DAILY
Qty: 90 TABLET | Refills: 1 | Status: SHIPPED | OUTPATIENT
Start: 2025-07-23

## 2025-07-23 RX ORDER — LEVOTHYROXINE SODIUM 75 UG/1
75 TABLET ORAL
Qty: 90 TABLET | Refills: 1 | Status: SHIPPED | OUTPATIENT
Start: 2025-07-23

## 2025-07-23 RX ORDER — SIMVASTATIN 40 MG/1
40 TABLET, FILM COATED ORAL NIGHTLY
Qty: 90 TABLET | Refills: 1 | Status: SHIPPED | OUTPATIENT
Start: 2025-07-23

## 2025-07-23 NOTE — ASSESSMENT & PLAN NOTE
-continue Famotidine  -reviewed foods and modulate GE junction sphincter integrity  -avoid large volumes of food and liquids 2 hours prior to bedtime and remain upright at least 30 minutes following the ingestion of food/liquid.

## 2025-07-23 NOTE — ASSESSMENT & PLAN NOTE
-target blood pressure reviewed  -continue antihypertensive  -dash diet reviewed  -monitor home BP is

## 2025-07-23 NOTE — PROGRESS NOTES
[facility]      Subjective         Chief Complaint   Patient presents with    [facility]      Yin 85-year-old RH dominant  female presents to establish/PCP.  Previous PCP Dr. Reynolds, [facility].  Patient has known T2DM, HTN, hypothyroidism, GERD, HLD, taking medications as prescribed.  Patient reports blood sugars and blood pressures have been well controlled.  Here with daughter today.  Primary concern right shoulder pain.  One year history, felt as if she strained right shoulder while she was trying to get up out of a chair.  She saw her then PCP, evaluated, subsequently referred to physical therapy which she completed about 4 weeks.  She failed follow up with the PCP and renewal of physical therapy order and then therefore discontinued.      Today, she reports continued discomfort with the right shoulder, particularly with movements overhead, without weakness numbness or tingling in the extremity.  She denies locking, and grinding.  Exacerbating factors physical activity, alleviating factors:  Rest.  Pain rating today 3/10.  Worse pain rating 30 days: 7/10.  Has tried Salonpas for relief.    PHQ 2:0.    Past Medical History:   Diagnosis Date    Diabetes mellitus     Hyperlipidemia     Hypertension     Tachycardia     Thyroid disease         Past Surgical History:   Procedure Laterality Date    CHOLECYSTECTOMY      HYSTERECTOMY      TAHBSO age 31    OOPHORECTOMY Bilateral     BSO with hysterectomy    THYROID SURGERY      TOTAL KNEE REPLACEMENT USING COMPUTER NAVIGATION          Review of patient's allergies indicates:   Allergen Reactions    Hydrocodone-acetaminophen      Other reaction(s): Other (See Comments)  Makes her not feel good    Sulfamethoxazole-trimethoprim Rash        Review of Systems   Constitutional: Negative.    HENT: Negative.     Eyes: Negative.    Respiratory: Negative.     Cardiovascular: Negative.    Gastrointestinal: Negative.   "  Genitourinary: Negative.    Musculoskeletal: Negative.         Right shoulder pain   Skin: Negative.    Neurological: Negative.    Endo/Heme/Allergies: Negative.    Psychiatric/Behavioral: Negative.                 Objective      Vitals:    07/23/25 1105   BP: (!) 140/60   BP Location: Left arm   Patient Position: Sitting   Pulse: 61   SpO2: 98%   Weight: 72.8 kg (160 lb 9.6 oz)   Height: 5' 5" (1.651 m)        Physical Exam  Vitals reviewed.   Constitutional:       General: She is not in acute distress.     Appearance: Normal appearance.   HENT:      Head: Normocephalic and atraumatic.      Right Ear: Tympanic membrane and ear canal normal.      Left Ear: Tympanic membrane and ear canal normal.      Nose: Nose normal.      Mouth/Throat:      Mouth: Mucous membranes are moist.      Pharynx: Oropharynx is clear.   Eyes:      Extraocular Movements: Extraocular movements intact.      Conjunctiva/sclera: Conjunctivae normal.   Cardiovascular:      Rate and Rhythm: Normal rate and regular rhythm.      Pulses: Normal pulses.   Pulmonary:      Effort: Pulmonary effort is normal.      Breath sounds: Normal breath sounds.   Abdominal:      General: Abdomen is flat.      Palpations: Abdomen is soft. There is no mass.      Tenderness: There is no abdominal tenderness.   Musculoskeletal:         General: Normal range of motion.      Cervical back: Neck supple.      Right lower leg: No edema.      Left lower leg: No edema.      Comments: Right shoulder:  Normal shape and contour.  Abduction, adduction, with internal external rotation elicits discomfort.  Drop can negative.  No bicipital tendon tenderness has a passes through the groove.  Biceps/triceps/deltoid strength normal.  Most pain elicited with shoulder abduction at 90° both forward and lateral.  There is no shoulder laxity or subluxation.  There is no pain with axial loading.  Drop can positive for pain only   Lymphadenopathy:      Cervical: No cervical adenopathy. "   Skin:     General: Skin is warm and dry.   Neurological:      General: No focal deficit present.      Mental Status: She is alert.   Psychiatric:         Mood and Affect: Mood normal.         Lab Results   Component Value Date    TSH 0.41 04/04/2025    TSH 0.248 (L) 02/19/2024    TSH 0.20 (L) 06/06/2023        Lab Results   Component Value Date    HGBA1C 6.0 (H) 07/23/2025    HGBA1C 5.6 04/04/2025    HGBA1C 6.1 (H) 09/19/2024      Lab Results   Component Value Date    HGBA1C 6.0 (H) 07/23/2025    HGBA1C 5.6 04/04/2025    HGBA1C 6.1 (H) 09/19/2024     CMP  Sodium   Date Value Ref Range Status   09/19/2024 142 136 - 145 mmol/L Final   02/19/2024 142 136 - 145 mmol/L Final   11/01/2022 144 136 - 147 mmol/L Final   04/27/2022 144 136 - 147 mmol/L Final   12/09/2021 142 136 - 147 mmol/L Final     Potassium   Date Value Ref Range Status   09/19/2024 4.6 3.5 - 5.1 mmol/L Final   02/19/2024 4.0 3.5 - 5.1 mmol/L Final   11/01/2022 4.6 3.5 - 5.1 mmol/L Final   04/27/2022 4.6 3.5 - 5.1 mmol/L Final   12/09/2021 4.1 3.5 - 5.1 mmol/L Final     Chloride   Date Value Ref Range Status   09/19/2024 106 95 - 110 mmol/L Final   02/19/2024 107 95 - 110 mmol/L Final   11/01/2022 107 98 - 107 mmol/L Final   04/27/2022 108 (H) 98 - 107 mmol/L Final   12/09/2021 108 (H) 98 - 107 mmol/L Final     CO2   Date Value Ref Range Status   09/19/2024 27 23 - 29 mmol/L Final   02/19/2024 25 23 - 29 mmol/L Final   11/01/2022 29 20 - 31 mmol/L Final   04/27/2022 32 (H) 20 - 31 mmol/L Final   12/09/2021 28 20 - 31 mmol/L Final     Glucose   Date Value Ref Range Status   09/19/2024 107 70 - 110 mg/dL Final   02/19/2024 99 70 - 110 mg/dL Final     BUN   Date Value Ref Range Status   09/19/2024 9 8 - 23 mg/dL Final   02/19/2024 12 8 - 23 mg/dL Final   11/01/2022 13 9 - 23 mg/dL Final   04/27/2022 12 9 - 23 mg/dL Final   12/09/2021 13 9 - 23 mg/dL Final     Creatinine   Date Value Ref Range Status   09/19/2024 0.9 0.5 - 1.4 mg/dL Final   02/19/2024 0.9  0.5 - 1.4 mg/dL Final   11/01/2022 0.88 0.55 - 1.02 mg/dL Final   04/27/2022 0.80 0.55 - 1.02 mg/dL Final   12/09/2021 0.83 0.55 - 1.02 mg/dL Final     Calcium   Date Value Ref Range Status   09/19/2024 9.6 8.7 - 10.5 mg/dL Final   02/19/2024 9.2 8.7 - 10.5 mg/dL Final   11/01/2022 9.8 8.3 - 10.6 mg/dL Final   04/27/2022 9.6 8.3 - 10.6 mg/dL Final   12/09/2021 10.0 8.3 - 10.6 mg/dL Final     Total Protein   Date Value Ref Range Status   09/19/2024 7.4 6.0 - 8.4 g/dL Final   02/19/2024 7.4 6.0 - 8.4 g/dL Final     Albumin   Date Value Ref Range Status   09/19/2024 4.1 3.5 - 5.2 g/dL Final   02/19/2024 3.8 3.5 - 5.2 g/dL Final     Albumin Level   Date Value Ref Range Status   11/01/2022 4.4 3.2 - 4.8 g/dL Final   04/27/2022 4.4 3.2 - 4.8 g/dL Final   12/09/2021 4.8 3.2 - 4.8 g/dL Final     Total Bilirubin   Date Value Ref Range Status   09/19/2024 0.7 0.1 - 1.0 mg/dL Final     Comment:     For infants and newborns, interpretation of results should be based  on gestational age, weight and in agreement with clinical  observations.    Premature Infant recommended reference ranges:  Up to 24 hours.............<8.0 mg/dL  Up to 48 hours............<12.0 mg/dL  3-5 days..................<15.0 mg/dL  6-29 days.................<15.0 mg/dL     02/19/2024 0.7 0.1 - 1.0 mg/dL Final     Comment:     For infants and newborns, interpretation of results should be based  on gestational age, weight and in agreement with clinical  observations.    Premature Infant recommended reference ranges:  Up to 24 hours.............<8.0 mg/dL  Up to 48 hours............<12.0 mg/dL  3-5 days..................<15.0 mg/dL  6-29 days.................<15.0 mg/dL       Alkaline Phosphatase   Date Value Ref Range Status   09/19/2024 59 55 - 135 U/L Final   02/19/2024 61 55 - 135 U/L Final     Alk Phos   Date Value Ref Range Status   11/01/2022 71 46 - 116 IU/L Final   04/27/2022 64 46 - 116 IU/L Final   12/09/2021 69 46 - 116 IU/L Final     AST   Date  Value Ref Range Status   09/19/2024 19 10 - 40 U/L Final   02/19/2024 15 10 - 40 U/L Final     Aspartate Aminotransferase   Date Value Ref Range Status   11/01/2022 18 <34 IU/L Final   04/27/2022 17 <34 IU/L Final   12/09/2021 22 <34 IU/L Final     ALT   Date Value Ref Range Status   09/19/2024 11 10 - 44 U/L Final   02/19/2024 13 10 - 44 U/L Final     Alanine Aminotransferase   Date Value Ref Range Status   11/01/2022 14 10 - 49 IU/L Final   04/27/2022 12 10 - 49 IU/L Final   12/09/2021 13 10 - 49 IU/L Final     Anion Gap   Date Value Ref Range Status   09/19/2024 9 8 - 16 mmol/L Final   02/19/2024 10 8 - 16 mmol/L Final     eGFR    Date Value Ref Range Status   11/01/2022 71 (L) >90 mL/min/1.73m2 Final   04/27/2022 80 (L) >90 mL/min/1.73m2 Final   12/09/2021 77 (L) >90 mL/min/1.73m2 Final     eGFR   Date Value Ref Range Status   11/01/2022 62 (L) >90 mL/min/1.73m2 Final   04/27/2022 69 (L) >90 mL/min/1.73m2 Final   12/09/2021 66 (L) >90 mL/min/1.73m2 Final      Lab Results   Component Value Date    CHOL 170 04/04/2025    CHOL 156 02/19/2024    CHOL 156 06/06/2023     Lab Results   Component Value Date    HDL 62 (H) 04/04/2025    HDL 61 02/19/2024    HDL 55 06/06/2023     Lab Results   Component Value Date    LDLCALC 92 04/04/2025    LDLCALC 84.4 02/19/2024    LDLCALC 85 06/06/2023     Lab Results   Component Value Date    TRIG 79 04/04/2025    TRIG 53 02/19/2024    TRIG 79 06/06/2023     Lab Results   Component Value Date    CHOLHDL 39.1 02/19/2024          Medication List with Changes/Refills   Current Medications    FAMOTIDINE (PEPCID) 40 MG TABLET    TAKE 1 TABLET BY MOUTH ONCE DAILY AT NIGHT AS NEEDED FOR HEART    LATANOPROST 0.005 % OPHTHALMIC SOLUTION    Place 1 drop into both eyes nightly.    MULTIVITAMIN (THERAGRAN) PER TABLET    Take 1 tablet by mouth once daily.   Changed and/or Refilled Medications    Modified Medication Previous Medication    LEVOTHYROXINE (SYNTHROID) 75 MCG TABLET  levothyroxine (SYNTHROID) 75 MCG tablet       Take 1 tablet (75 mcg total) by mouth before breakfast.    Take 1 tablet (75 mcg total) by mouth before breakfast.    METFORMIN (GLUCOPHAGE-XR) 500 MG ER 24HR TABLET metFORMIN (GLUCOPHAGE-XR) 500 MG ER 24hr tablet       Take 1 tablet (500 mg total) by mouth every evening.    Take 1 tablet (500 mg total) by mouth every evening.    METOPROLOL SUCCINATE (TOPROL-XL) 50 MG 24 HR TABLET metoprolol succinate (TOPROL-XL) 50 MG 24 hr tablet       Take 1 tablet (50 mg total) by mouth once daily.    Take 1 tablet (50 mg total) by mouth once daily.    SIMVASTATIN (ZOCOR) 40 MG TABLET simvastatin (ZOCOR) 40 MG tablet       Take 1 tablet (40 mg total) by mouth every evening.    TAKE 1 TABLET BY MOUTH ONCE DAILY IN THE EVENING           Assessment & Plan     Assessment & Plan  Essential hypertension, benign  -target blood pressure reviewed  -continue antihypertensive  -dash diet reviewed  -monitor home BP is       Mixed hyperlipidemia  -continue Simvastatin 40 mg HS  -low-cholesterol diet advised  -semi annual lipid assessment       Hypothyroidism, unspecified type  -clinically euthyroid  -TSH free T4  -continue Levothyroxine 75 mcg morning on empty stomach         Controlled type 2 diabetes mellitus with hyperglycemia, without long-term current use of insulin  -previous A1c/glucose suggest reasonable control  -dietary approaches to glycemic control reviewed  -glucose home monitoring recommended  -annual urinary microalbumin assessment/reviewed    Orders:    Hemoglobin A1C; Future    Gastroesophageal reflux disease without esophagitis  -continue Famotidine  -reviewed foods and modulate GE junction sphincter integrity  -avoid large volumes of food and liquids 2 hours prior to bedtime and remain upright at least 30 minutes following the ingestion of food/liquid.         Chronic right shoulder pain  As above, x-ray ordered.  -we will see the patient back to discuss options including  physical therapy, intra or extra-articular shoulder injection, over-the-counter medications and aids that diminished pain    Orders:    X-ray Shoulder 2 or More Views Right; Future     Plan of care discussed with the patient she understands and elects to proceed.  All questions answered.     Mayur Mccoy MD  [facility] 2nd [facility]  [address] [facility] [place] [address]  [phone]

## 2025-07-23 NOTE — ASSESSMENT & PLAN NOTE
As above, x-ray ordered.  -we will see the patient back to discuss options including physical therapy, intra or extra-articular shoulder injection, over-the-counter medications and aids that diminished pain    Orders:    X-ray Shoulder 2 or More Views Right; Future

## 2025-07-23 NOTE — ASSESSMENT & PLAN NOTE
-previous A1c/glucose suggest reasonable control  -dietary approaches to glycemic control reviewed  -glucose home monitoring recommended  -annual urinary microalbumin assessment/reviewed    Orders:    Hemoglobin A1C; Future

## 2025-08-06 ENCOUNTER — OFFICE VISIT (OUTPATIENT)
Dept: FAMILY MEDICINE | Facility: CLINIC | Age: 85
End: 2025-08-06
Payer: MEDICARE

## 2025-08-06 VITALS
HEIGHT: 65 IN | BODY MASS INDEX: 26.82 KG/M2 | DIASTOLIC BLOOD PRESSURE: 60 MMHG | OXYGEN SATURATION: 99 % | HEART RATE: 67 BPM | WEIGHT: 161 LBS | SYSTOLIC BLOOD PRESSURE: 108 MMHG

## 2025-08-06 DIAGNOSIS — G89.29 CHRONIC RIGHT SHOULDER PAIN: Primary | ICD-10-CM

## 2025-08-06 DIAGNOSIS — M25.511 CHRONIC RIGHT SHOULDER PAIN: Primary | ICD-10-CM

## 2025-08-06 DIAGNOSIS — M19.011 PRIMARY OSTEOARTHRITIS OF RIGHT SHOULDER: ICD-10-CM

## 2025-08-06 DIAGNOSIS — M75.01 ADHESIVE CAPSULITIS OF RIGHT SHOULDER: ICD-10-CM

## 2025-08-06 PROCEDURE — G2211 COMPLEX E/M VISIT ADD ON: HCPCS | Mod: ,,, | Performed by: FAMILY MEDICINE

## 2025-08-06 PROCEDURE — 99999 PR PBB SHADOW E&M-EST. PATIENT-LVL IV: CPT | Mod: PBBFAC,,, | Performed by: FAMILY MEDICINE

## 2025-08-06 PROCEDURE — 99214 OFFICE O/P EST MOD 30 MIN: CPT | Mod: S$PBB,,, | Performed by: FAMILY MEDICINE

## 2025-08-06 PROCEDURE — 99214 OFFICE O/P EST MOD 30 MIN: CPT | Mod: PBBFAC | Performed by: FAMILY MEDICINE

## 2025-08-06 NOTE — ASSESSMENT & PLAN NOTE
-start physical therapy 2 times per week x6 weeks  -was previously enrolled in physical therapy about 4-5 months ago but discontinued owing to issues with transportation  -compound musculoskeletal cream called to see [facility], [place], [place].:  5% ketoprofen, 6% gabapentin, lidocaine 5%, amitriptyline 2%, to apply to right shoulder thin layer TID    Orders:    Ambulatory Referral/Consult to Physical Therapy

## 2025-08-06 NOTE — PROGRESS NOTES
"[facility]      Subjective         Chief Complaint   Patient presents with    Follow-up      Pleasant 85-year-old  female presents for follow up fall during tomography of the right shoulder for chronic right shoulder pain.    Past Medical History:   Diagnosis Date    Diabetes mellitus     Hyperlipidemia     Hypertension     Tachycardia     Thyroid disease         Past Surgical History:   Procedure Laterality Date    CHOLECYSTECTOMY      HYSTERECTOMY      TAHBSO age 31    OOPHORECTOMY Bilateral     BSO with hysterectomy    THYROID SURGERY      TOTAL KNEE REPLACEMENT USING COMPUTER NAVIGATION          Review of patient's allergies indicates:   Allergen Reactions    Hydrocodone-acetaminophen      Other reaction(s): Other (See Comments)  Makes her not feel good    Sulfamethoxazole-trimethoprim Rash        Review of Systems   Musculoskeletal:         Chronic right shoulder pain               Objective      Vitals:    08/06/25 1107   BP: 108/60   BP Location: Left arm   Patient Position: Sitting   Pulse: 67   SpO2: 99%   Weight: 73 kg (161 lb)   Height: 5' 5" (1.651 m)        Physical Exam  Musculoskeletal:      Comments: Right shoulder:  Normal shape and contour.  Abduction, adduction, with internal external rotation elicits discomfort.  Drop can negative able to forward flex to 90°.  Active abduction 90°, mild discomfort.  Moderate tenderness TTP anterior cuff, no lateral deltoid tenderness to palpation         Lab Results   Component Value Date    TSH 0.41 04/04/2025    TSH 0.248 (L) 02/19/2024    TSH 0.20 (L) 06/06/2023        Lab Results   Component Value Date    HGBA1C 6.0 (H) 07/23/2025    HGBA1C 5.6 04/04/2025    HGBA1C 6.1 (H) 09/19/2024      Lab Results   Component Value Date    HGBA1C 6.0 (H) 07/23/2025    HGBA1C 5.6 04/04/2025    HGBA1C 6.1 (H) 09/19/2024     CMP  Sodium   Date Value Ref Range Status   09/19/2024 142 136 - 145 mmol/L Final   02/19/2024 142 136 - 145 " mmol/L Final   11/01/2022 144 136 - 147 mmol/L Final   04/27/2022 144 136 - 147 mmol/L Final   12/09/2021 142 136 - 147 mmol/L Final     Potassium   Date Value Ref Range Status   09/19/2024 4.6 3.5 - 5.1 mmol/L Final   02/19/2024 4.0 3.5 - 5.1 mmol/L Final   11/01/2022 4.6 3.5 - 5.1 mmol/L Final   04/27/2022 4.6 3.5 - 5.1 mmol/L Final   12/09/2021 4.1 3.5 - 5.1 mmol/L Final     Chloride   Date Value Ref Range Status   09/19/2024 106 95 - 110 mmol/L Final   02/19/2024 107 95 - 110 mmol/L Final   11/01/2022 107 98 - 107 mmol/L Final   04/27/2022 108 (H) 98 - 107 mmol/L Final   12/09/2021 108 (H) 98 - 107 mmol/L Final     CO2   Date Value Ref Range Status   09/19/2024 27 23 - 29 mmol/L Final   02/19/2024 25 23 - 29 mmol/L Final   11/01/2022 29 20 - 31 mmol/L Final   04/27/2022 32 (H) 20 - 31 mmol/L Final   12/09/2021 28 20 - 31 mmol/L Final     Glucose   Date Value Ref Range Status   09/19/2024 107 70 - 110 mg/dL Final   02/19/2024 99 70 - 110 mg/dL Final     BUN   Date Value Ref Range Status   09/19/2024 9 8 - 23 mg/dL Final   02/19/2024 12 8 - 23 mg/dL Final   11/01/2022 13 9 - 23 mg/dL Final   04/27/2022 12 9 - 23 mg/dL Final   12/09/2021 13 9 - 23 mg/dL Final     Creatinine   Date Value Ref Range Status   09/19/2024 0.9 0.5 - 1.4 mg/dL Final   02/19/2024 0.9 0.5 - 1.4 mg/dL Final   11/01/2022 0.88 0.55 - 1.02 mg/dL Final   04/27/2022 0.80 0.55 - 1.02 mg/dL Final   12/09/2021 0.83 0.55 - 1.02 mg/dL Final     Calcium   Date Value Ref Range Status   09/19/2024 9.6 8.7 - 10.5 mg/dL Final   02/19/2024 9.2 8.7 - 10.5 mg/dL Final   11/01/2022 9.8 8.3 - 10.6 mg/dL Final   04/27/2022 9.6 8.3 - 10.6 mg/dL Final   12/09/2021 10.0 8.3 - 10.6 mg/dL Final     Total Protein   Date Value Ref Range Status   09/19/2024 7.4 6.0 - 8.4 g/dL Final   02/19/2024 7.4 6.0 - 8.4 g/dL Final     Albumin   Date Value Ref Range Status   09/19/2024 4.1 3.5 - 5.2 g/dL Final   02/19/2024 3.8 3.5 - 5.2 g/dL Final     Albumin Level   Date Value Ref  Range Status   11/01/2022 4.4 3.2 - 4.8 g/dL Final   04/27/2022 4.4 3.2 - 4.8 g/dL Final   12/09/2021 4.8 3.2 - 4.8 g/dL Final     Total Bilirubin   Date Value Ref Range Status   09/19/2024 0.7 0.1 - 1.0 mg/dL Final     Comment:     For infants and newborns, interpretation of results should be based  on gestational age, weight and in agreement with clinical  observations.    Premature Infant recommended reference ranges:  Up to 24 hours.............<8.0 mg/dL  Up to 48 hours............<12.0 mg/dL  3-5 days..................<15.0 mg/dL  6-29 days.................<15.0 mg/dL     02/19/2024 0.7 0.1 - 1.0 mg/dL Final     Comment:     For infants and newborns, interpretation of results should be based  on gestational age, weight and in agreement with clinical  observations.    Premature Infant recommended reference ranges:  Up to 24 hours.............<8.0 mg/dL  Up to 48 hours............<12.0 mg/dL  3-5 days..................<15.0 mg/dL  6-29 days.................<15.0 mg/dL       Alkaline Phosphatase   Date Value Ref Range Status   09/19/2024 59 55 - 135 U/L Final   02/19/2024 61 55 - 135 U/L Final     Alk Phos   Date Value Ref Range Status   11/01/2022 71 46 - 116 IU/L Final   04/27/2022 64 46 - 116 IU/L Final   12/09/2021 69 46 - 116 IU/L Final     AST   Date Value Ref Range Status   09/19/2024 19 10 - 40 U/L Final   02/19/2024 15 10 - 40 U/L Final     Aspartate Aminotransferase   Date Value Ref Range Status   11/01/2022 18 <34 IU/L Final   04/27/2022 17 <34 IU/L Final   12/09/2021 22 <34 IU/L Final     ALT   Date Value Ref Range Status   09/19/2024 11 10 - 44 U/L Final   02/19/2024 13 10 - 44 U/L Final     Alanine Aminotransferase   Date Value Ref Range Status   11/01/2022 14 10 - 49 IU/L Final   04/27/2022 12 10 - 49 IU/L Final   12/09/2021 13 10 - 49 IU/L Final     Anion Gap   Date Value Ref Range Status   09/19/2024 9 8 - 16 mmol/L Final   02/19/2024 10 8 - 16 mmol/L Final     eGFR    Date Value  Ref Range Status   11/01/2022 71 (L) >90 mL/min/1.73m2 Final   04/27/2022 80 (L) >90 mL/min/1.73m2 Final   12/09/2021 77 (L) >90 mL/min/1.73m2 Final     eGFR   Date Value Ref Range Status   11/01/2022 62 (L) >90 mL/min/1.73m2 Final   04/27/2022 69 (L) >90 mL/min/1.73m2 Final   12/09/2021 66 (L) >90 mL/min/1.73m2 Final      Lab Results   Component Value Date    CHOL 170 04/04/2025    CHOL 156 02/19/2024    CHOL 156 06/06/2023     Lab Results   Component Value Date    HDL 62 (H) 04/04/2025    HDL 61 02/19/2024    HDL 55 06/06/2023     Lab Results   Component Value Date    LDLCALC 92 04/04/2025    LDLCALC 84.4 02/19/2024    LDLCALC 85 06/06/2023     Lab Results   Component Value Date    TRIG 79 04/04/2025    TRIG 53 02/19/2024    TRIG 79 06/06/2023     Lab Results   Component Value Date    CHOLHDL 39.1 02/19/2024          Medication List with Changes/Refills   Current Medications    FAMOTIDINE (PEPCID) 40 MG TABLET    TAKE 1 TABLET BY MOUTH ONCE DAILY AT NIGHT AS NEEDED FOR HEART    LATANOPROST 0.005 % OPHTHALMIC SOLUTION    Place 1 drop into both eyes nightly.    LEVOTHYROXINE (SYNTHROID) 75 MCG TABLET    Take 1 tablet (75 mcg total) by mouth before breakfast.    METFORMIN (GLUCOPHAGE-XR) 500 MG ER 24HR TABLET    Take 1 tablet (500 mg total) by mouth every evening.    METOPROLOL SUCCINATE (TOPROL-XL) 50 MG 24 HR TABLET    Take 1 tablet (50 mg total) by mouth once daily.    MULTIVITAMIN (THERAGRAN) PER TABLET    Take 1 tablet by mouth once daily.    SIMVASTATIN (ZOCOR) 40 MG TABLET    Take 1 tablet (40 mg total) by mouth every evening.           Assessment & Plan     Assessment & Plan  Chronic right shoulder pain  -start physical therapy 2 times per week x6 weeks  -was previously enrolled in physical therapy about 4-5 months ago but discontinued owing to issues with transportation  -compound musculoskeletal cream called to see [facility], [place], [place].:  5% ketoprofen, 6% gabapentin, lidocaine 5%,  amitriptyline 2%, to apply to right shoulder thin layer TID    Orders:    Ambulatory Referral/Consult to Physical Therapy    Primary osteoarthritis of right shoulder  -we will see the patient back in about 3 weeks  -patient defers shoulder injection/intra-articular today, reassess in 3 weeks reconsider at that time       Adhesive capsulitis of right shoulder  Reviewed goals of therapy to reduce pain and maintain functional ability  -start physical therapy 2 times per week x6 weeks  -was previously enrolled in physical therapy about 4-5 months ago but discontinued owing to issues with transportation  -compound musculoskeletal cream called to see [facility], [place], [place].:  5% ketoprofen, 6% gabapentin, lidocaine 5%, amitriptyline 2%, to apply to right shoulder thin layer TID               Mayur Mccoy MD  [facility] 2nd [facility] Family Medicine  [address] [place] [address],[place] [address] [phone]

## 2025-08-25 DIAGNOSIS — Z00.00 ENCOUNTER FOR MEDICARE ANNUAL WELLNESS EXAM: ICD-10-CM
